# Patient Record
Sex: MALE | Race: WHITE | NOT HISPANIC OR LATINO | Employment: OTHER | URBAN - METROPOLITAN AREA
[De-identification: names, ages, dates, MRNs, and addresses within clinical notes are randomized per-mention and may not be internally consistent; named-entity substitution may affect disease eponyms.]

---

## 2022-05-11 ENCOUNTER — E-VISIT (OUTPATIENT)
Dept: URGENT CARE | Facility: CLINIC | Age: 32
End: 2022-05-11
Payer: COMMERCIAL

## 2022-05-11 DIAGNOSIS — L21.9 SEBORRHEIC DERMATITIS: Primary | ICD-10-CM

## 2022-05-11 PROCEDURE — 99421 OL DIG E/M SVC 5-10 MIN: CPT | Performed by: PHYSICIAN ASSISTANT

## 2022-05-11 RX ORDER — KETOCONAZOLE 20 MG/ML
SHAMPOO TOPICAL
Qty: 120 ML | Refills: 3 | Status: SHIPPED | OUTPATIENT
Start: 2022-05-11

## 2022-05-12 NOTE — PATIENT INSTRUCTIONS
Dear Tab Campbell    I have sent in a topical antifungal medication for this problem. Steroids like you have used in the past are some help but usually symptoms continue to come back and there are long term side effects to using those medications regularily.    Please try this medicated medication Ketoconazole 2% which can be helpful at improving symptoms but also keeping them under control.          Seborrheic Dermatitis  What Is Seborrheic Dermatitis?    This is a very common skin disease that causes a rash on the skin. When the rash appears it often looks red, swollen, and greasy. It may or may not have a white or yellowish crusty scale to it.     Sometimes the skin may be itchy.    Seborrheic dermatitis can look like psoriasis and eczema.    This skin condition is not caused by poor hygiene.     Adolescents and Adults: Scalp  Depending on your hair type, you can consider shampooing more often which can help.   For the scalp, many people find relief from using a dandruff shampoo  Use a dandruff shampoo twice a week. If using one dandruff shampoo does not bring relief, try alternating dandruff shampoos. Each dandruff shampoo should contain a different active ingredient. The active ingredients in dandruff shampoos are;    Zinc pyrithione    Salicylic acid and sulfur    Coal tar    Selenium sulfide    Ketoconazole  ** If you have blond, gray or white hair, do not use dandruff shampoos that contain coal tar, as this can discolor your hair.  When using dandruff shampoos; follow the instructions on the shampoo bottle. Some require that you lather and leave it on for about five minutes before rinsing. Others should not be left on the scalp.  If you use a shampoo that contains coal tar, you must protect your scalp from the sun. You can do this by wearing a hat when outdoors and not using indoor tanning devices such as tanning beds or sun lamps.    Thanks for choosing us as your health care partner,    [facility]  AYANA Escalona

## 2022-06-10 NOTE — PROGRESS NOTES
CC: Tab Campbell is an 31 year old male who presents for preventative health visit.     ASSESSMENT/PLAN:   (Z00.00) Routine general medical examination at a health care facility  (primary encounter diagnosis)  Comment: Patient appears healthy, exercises frequently, and eats healthy food.   Plan: CBC with platelets, Comprehensive metabolic         panel (BMP + Alb, Alk Phos, ALT, AST, Total.         Bili, TP)    (L40.9) Psoriasis of scalp  Comment: Patient does not remember the name of his previous medication but did take a picture of the medication  Plan: Patient advised to message provider the name of his previous medication via [employer]. Will provide perscription for new medication once we have that information    (R20.9) Altered sensation, foot  Comment: possibly sciatic nerve involvement  Plan: Patient shown how to perform nerve flossing. Was advised to perform 20 reps/leg each day. Will monitor.    (R25.3) Muscle twitch  Comment: likely idiopathic or from dehydration  Plan: Will check BMP for electrolyte distrubances. Patient advised to hydrate and perform calf stretches    (R21) Rash and nonspecific skin eruption, chest  Comment: Painful rash on chest upon exercise, disappears with continued exercise - not a concern at this time  Plan: monitor    (Z11.4) Screening for HIV (human immunodeficiency virus) and hepatitis C screening test   Comment: no known exposures  Plan: HIV Antigen Antibody Combo  Hepatitis C Screen Reflex to HCV RNA Quant and Genotype        SUBJECTIVE:     HPI:  Tab is a 31 year old man who presents for a preventative health visit. Tab recently moved back to [place] from [place]. He has no known family history of cardiovascular disease, diabetes, or hypertension. His mother does have a history of a rare disease known as delayed pressure urticaria. Tab says that when he bends over (ex tying shoes) and sits upward, he has blotching skin on his forehead. He was able to  demonstrate this phenomenon. However, the red blotches quickly disappear and no not bother him. He also sometimes gets a painful rash on his chest when he exercises, which disappears as he continues to exercise.     He has a history of psoriasis on his scalp, which is worse during the winter months. He was switched from his previous medication (unknown) to a ketoconazole shampoo. He does not like his new shampoo and would like to switch back to his old medication.     For the past few years, Tab has had an occasional tickling sensation to his plantar feet bilaterally. The sensations bothers him to the point that he must get up from the couch to walk around while watching movie. The sensation seems to occur when sitting and does not bother him while he sleeps.     For the past few months, Tab has noticed twitching in his calves bilaterally. It does not bother him.       Patient has been advised of split billing requirements and indicates understanding: Yes  Healthy Habits:     Getting at least 3 servings of Calcium per day:  Yes    Bi-annual eye exam:  NO    Dental care twice a year:  NO    Sleep apnea or symptoms of sleep apnea:  None    Diet:  Regular (no restrictions)    Frequency of exercise:  2-3 days/week    Duration of exercise:  30-45 minutes    Taking medications regularly:  Yes    Medication side effects:  None    PHQ-2 Total Score: 0    Additional concerns today:  Yes    Today's PHQ-2 Score:   PHQ-2 ( 1999 [employer]) 6/13/2022   Q1: Little interest or pleasure in doing things 0   Q2: Feeling down, depressed or hopeless 0   PHQ-2 Score 0   Q1: Little interest or pleasure in doing things Not at all   Q2: Feeling down, depressed or hopeless Not at all   PHQ-2 Score 0       Abuse: Current or Past(Physical, Sexual or Emotional)- No  Do you feel safe in your environment? Yes    Have you ever done Advance Care Planning?no    Social History     Tobacco Use     Smoking status: Not on file     Smokeless tobacco:  Not on file   Substance Use Topics     Alcohol use: Not on file     If you drink alcohol do you typically have >3 drinks per day or >7 drinks per week? No    Alcohol Use 6/13/2022   Prescreen: >3 drinks/day or >7 drinks/week? No   No flowsheet data found.    Last PSA: No results found for: PSA    Reviewed orders with patient. Reviewed health maintenance and updated orders accordingly - Yes  Lab work is in process    Reviewed and updated as needed this visit by clinical staff                    Reviewed and updated as needed this visit by Provider                       Review of Systems   Constitutional: Negative for chills and fever.   HENT: Negative for congestion, ear pain, hearing loss and sore throat.    Eyes: Negative for pain and visual disturbance.   Respiratory: Negative for cough and shortness of breath.    Cardiovascular: Negative for chest pain, palpitations and peripheral edema.   Gastrointestinal: Negative for abdominal pain, constipation, diarrhea, heartburn, hematochezia and nausea.   Genitourinary: Negative for dysuria, frequency, genital sores, hematuria and urgency.   Musculoskeletal: Negative for arthralgias, joint swelling and myalgias.   Skin: Negative for rash.   Neurological: Negative for dizziness, weakness, headaches and paresthesias.   Psychiatric/Behavioral: Negative for mood changes. The patient is not nervous/anxious.      INTEGUMENTARY/SKIN: NEGATIVE for worrisome rashes, moles or lesions    OBJECTIVE:   There were no vitals taken for this visit.    Physical Exam  GENERAL: healthy, alert and no distress  EYES: Eyes grossly normal to inspection, PERRL and conjunctivae and sclerae normal  HENT: ear canals and TM's normal, nose and mouth without ulcers or lesions  NECK: no adenopathy, no asymmetry, masses, or scars and thyroid normal to palpation  RESP: lungs clear to auscultation - no rales, rhonchi or wheezes  CV: regular rate and rhythm, normal S1 S2, no S3 or S4, no murmur, click or  rub, no peripheral edema and peripheral pulses strong  ABDOMEN: soft, nontender, no hepatosplenomegaly, no masses and bowel sounds normal  MS: no gross musculoskeletal defects noted, no edema  SKIN: no suspicious lesions or rashes  NEURO: Normal strength and tone, mentation intact and speech normal  PSYCH: mentation appears normal, affect normal/bright    Diagnostic Test Results:  Labs reviewed in Epic  Patient has been advised of split billing requirements and indicates understanding: No    COUNSELING:   Reviewed preventive health counseling, as reflected in patient instructions       Regular exercise       Healthy diet/nutrition    Counseling Resources:  ATP IV Guidelines  Pooled Cohorts Equation Calculator  FRAX Risk Assessment  ICSI Preventive Guidelines  Dietary Guidelines for Americans, 2010  USDA's MyPlate  ASA Prophylaxis  Lung CA Screening    Scribe Disclosure:   I, Reagan Douglas, am serving as a scribe; to document services personally performed by Joe Kurtz MD- -based on data collection and the provider's statements to me.     Provider Disclosure:  I agree with above History, Review of Systems, Physical exam and Plan.  I have reviewed the content of the documentation and have edited it as needed. I have personally performed the services documented here and the documentation accurately represents those services and the decisions I have made.      Electronically signed by:    Joe Kurtz MD  [facility]

## 2022-06-13 ENCOUNTER — OFFICE VISIT (OUTPATIENT)
Dept: FAMILY MEDICINE | Facility: CLINIC | Age: 32
End: 2022-06-13
Payer: COMMERCIAL

## 2022-06-13 VITALS
SYSTOLIC BLOOD PRESSURE: 114 MMHG | DIASTOLIC BLOOD PRESSURE: 75 MMHG | HEART RATE: 60 BPM | OXYGEN SATURATION: 99 % | BODY MASS INDEX: 26.39 KG/M2 | TEMPERATURE: 96 F | HEIGHT: 74 IN | RESPIRATION RATE: 14 BRPM | WEIGHT: 205.6 LBS

## 2022-06-13 DIAGNOSIS — L40.9 PSORIASIS OF SCALP: ICD-10-CM

## 2022-06-13 DIAGNOSIS — Z00.00 ROUTINE GENERAL MEDICAL EXAMINATION AT A HEALTH CARE FACILITY: Primary | ICD-10-CM

## 2022-06-13 DIAGNOSIS — Z11.59 NEED FOR HEPATITIS C SCREENING TEST: ICD-10-CM

## 2022-06-13 DIAGNOSIS — R20.9 ALTERED SENSATION, FOOT: ICD-10-CM

## 2022-06-13 DIAGNOSIS — R25.3 MUSCLE TWITCH: ICD-10-CM

## 2022-06-13 DIAGNOSIS — R21 RASH AND NONSPECIFIC SKIN ERUPTION: ICD-10-CM

## 2022-06-13 DIAGNOSIS — Z11.4 SCREENING FOR HIV (HUMAN IMMUNODEFICIENCY VIRUS): ICD-10-CM

## 2022-06-13 LAB
ALBUMIN SERPL-MCNC: 4.1 G/DL (ref 3.4–5)
ALP SERPL-CCNC: 84 U/L (ref 40–150)
ALT SERPL W P-5'-P-CCNC: 22 U/L (ref 0–70)
ANION GAP SERPL CALCULATED.3IONS-SCNC: 3 MMOL/L (ref 3–14)
AST SERPL W P-5'-P-CCNC: 18 U/L (ref 0–45)
BILIRUB SERPL-MCNC: 0.4 MG/DL (ref 0.2–1.3)
BUN SERPL-MCNC: 16 MG/DL (ref 7–30)
CALCIUM SERPL-MCNC: 9.1 MG/DL (ref 8.5–10.1)
CHLORIDE BLD-SCNC: 111 MMOL/L (ref 94–109)
CO2 SERPL-SCNC: 27 MMOL/L (ref 20–32)
CREAT SERPL-MCNC: 0.79 MG/DL (ref 0.66–1.25)
ERYTHROCYTE [DISTWIDTH] IN BLOOD BY AUTOMATED COUNT: 13 % (ref 10–15)
GFR SERPL CREATININE-BSD FRML MDRD: >90 ML/MIN/1.73M2
GLUCOSE BLD-MCNC: 94 MG/DL (ref 70–99)
HCT VFR BLD AUTO: 42 % (ref 40–53)
HCV AB SERPL QL IA: NONREACTIVE
HGB BLD-MCNC: 13.7 G/DL (ref 13.3–17.7)
HIV 1+2 AB+HIV1 P24 AG SERPL QL IA: NONREACTIVE
MCH RBC QN AUTO: 29.9 PG (ref 26.5–33)
MCHC RBC AUTO-ENTMCNC: 32.6 G/DL (ref 31.5–36.5)
MCV RBC AUTO: 92 FL (ref 78–100)
PLATELET # BLD AUTO: 212 10E3/UL (ref 150–450)
POTASSIUM BLD-SCNC: 4.5 MMOL/L (ref 3.4–5.3)
PROT SERPL-MCNC: 7.2 G/DL (ref 6.8–8.8)
RBC # BLD AUTO: 4.58 10E6/UL (ref 4.4–5.9)
SODIUM SERPL-SCNC: 141 MMOL/L (ref 133–144)
WBC # BLD AUTO: 5.2 10E3/UL (ref 4–11)

## 2022-06-13 PROCEDURE — 36415 COLL VENOUS BLD VENIPUNCTURE: CPT | Performed by: FAMILY MEDICINE

## 2022-06-13 PROCEDURE — 80053 COMPREHEN METABOLIC PANEL: CPT | Performed by: FAMILY MEDICINE

## 2022-06-13 PROCEDURE — 87389 HIV-1 AG W/HIV-1&-2 AB AG IA: CPT | Performed by: FAMILY MEDICINE

## 2022-06-13 PROCEDURE — 85027 COMPLETE CBC AUTOMATED: CPT | Performed by: FAMILY MEDICINE

## 2022-06-13 PROCEDURE — 99385 PREV VISIT NEW AGE 18-39: CPT | Performed by: FAMILY MEDICINE

## 2022-06-13 PROCEDURE — 86803 HEPATITIS C AB TEST: CPT | Performed by: FAMILY MEDICINE

## 2022-06-13 ASSESSMENT — ENCOUNTER SYMPTOMS
MYALGIAS: 0
NERVOUS/ANXIOUS: 0
DIZZINESS: 0
NAUSEA: 0
CONSTIPATION: 0
HEARTBURN: 0
FREQUENCY: 0
PALPITATIONS: 0
ARTHRALGIAS: 0
EYE PAIN: 0
ABDOMINAL PAIN: 0
COUGH: 0
HEMATOCHEZIA: 0
PARESTHESIAS: 0
WEAKNESS: 0
JOINT SWELLING: 0
DIARRHEA: 0
DYSURIA: 0
CHILLS: 0
HEMATURIA: 0
SORE THROAT: 0
SHORTNESS OF BREATH: 0
FEVER: 0
HEADACHES: 0

## 2022-06-13 NOTE — NURSING NOTE
"Chief Complaint   Patient presents with     Physical     /75   Pulse 60   Temp (!) 96  F (35.6  C) (Tympanic)   Resp 14   Ht 1.88 m (6' 2\")   Wt 93.3 kg (205 lb 9.6 oz)   SpO2 99%   BMI 26.40 kg/m   Estimated body mass index is 26.4 kg/m  as calculated from the following:    Height as of this encounter: 1.88 m (6' 2\").    Weight as of this encounter: 93.3 kg (205 lb 9.6 oz).  bp completed using cuff size: regular      Health Maintenance addressed:  NONE    n/a    Vanna Sarmiento, RN, MA     "

## 2022-12-26 ENCOUNTER — HEALTH MAINTENANCE LETTER (OUTPATIENT)
Age: 32
End: 2022-12-26

## 2022-12-29 ENCOUNTER — E-VISIT (OUTPATIENT)
Dept: URGENT CARE | Facility: CLINIC | Age: 32
End: 2022-12-29
Payer: COMMERCIAL

## 2022-12-29 DIAGNOSIS — R21 RASH AND NONSPECIFIC SKIN ERUPTION: Primary | ICD-10-CM

## 2022-12-29 PROCEDURE — 99207 PR NON-BILLABLE SERV PER CHARTING: CPT | Performed by: NURSE PRACTITIONER

## 2022-12-30 ENCOUNTER — VIRTUAL VISIT (OUTPATIENT)
Dept: FAMILY MEDICINE | Facility: CLINIC | Age: 32
End: 2022-12-30
Payer: COMMERCIAL

## 2022-12-30 DIAGNOSIS — L40.9 PSORIASIS: Primary | ICD-10-CM

## 2022-12-30 PROCEDURE — 99213 OFFICE O/P EST LOW 20 MIN: CPT | Mod: 95 | Performed by: FAMILY MEDICINE

## 2022-12-30 RX ORDER — BETAMETHASONE DIPROPIONATE 0.05 %
OINTMENT (GRAM) TOPICAL AT BEDTIME
Qty: 50 G | Refills: 0 | Status: SHIPPED | OUTPATIENT
Start: 2022-12-30 | End: 2023-01-04

## 2022-12-30 RX ORDER — PREDNISONE 20 MG/1
20 TABLET ORAL DAILY
Qty: 5 TABLET | Refills: 0 | Status: SHIPPED | OUTPATIENT
Start: 2022-12-30 | End: 2023-01-04

## 2022-12-30 RX ORDER — BETAMETHASONE DIPROPIONATE 0.5 MG/G
LOTION TOPICAL AT BEDTIME
Qty: 60 ML | Refills: 0 | Status: SHIPPED | OUTPATIENT
Start: 2022-12-30 | End: 2023-01-06

## 2022-12-30 NOTE — PROGRESS NOTES
"Tab is a 32 year old who is being evaluated via a billable video visit.      How would you like to obtain your AVS? MyChart  If the video visit is dropped, the invitation should be resent by: Text to cell phone: [phone]  Will anyone else be joining your video visit? No        Assessment & Plan     Psoriasis  - was diagnosed in the past     Noticed flare up in the last week .  On the scalp and the forehead .      - betamethasone dipropionate (DIPROSONE) 0.05 % external ointment; Apply topically At Bedtime for 5 days  - betamethasone dipropionate (DIPROSONE) 0.05 % external lotion; Apply topically At Bedtime for 7 days ON AFFECTED AREA  - predniSONE (DELTASONE) 20 MG tablet; Take 1 tablet (20 mg) by mouth daily for 5 days    Discussed avoiding use close to the eyes   Discussed dermatology visit if it fails to improve .          Return in about 4 weeks (around 1/27/2023) for Follow up, if symptoms fail to imptove.    Elena Mason MD  [facility]    Subjective    presenting for the following health issues:  Derm Problem (Follow-up Psoriasis)      History of Present Illness       Reason for visit:  Psoriasis    He eats 2-3 servings of fruits and vegetables daily.He consumes 0 sweetened beverage(s) daily.He exercises with enough effort to increase his heart rate 20 to 29 minutes per day.  He exercises with enough effort to increase his heart rate 4 days per week.   He is taking medications regularly.           Review of Systems   Skin rash .      Objective    Vitals - Patient Reported  Weight (Patient Reported): 93 kg (205 lb)  Height (Patient Reported): 188 cm (6' 2\")  BMI (Based on Pt Reported Ht/Wt): 26.32      Vitals:  No vitals were obtained today due to virtual visit.    Physical Exam   GENERAL: Healthy, alert and no distress  EYES: Eyes grossly normal to inspection.  No discharge or erythema, or obvious scleral/conjunctival abnormalities.  RESP: No audible wheeze, cough, or visible " cyanosis.  No visible retractions or increased work of breathing.    SKIN: erythematous rash on the scalp and face   Consistent with psoriasis .        Video-Visit Details    Type of service:  Video Visit     Originating Location (pt. Location): Home     START TIME :353  End time :4:03     Distant Location (provider location):  On-site  Platform used for Video Visit: [employer]

## 2022-12-30 NOTE — PATIENT INSTRUCTIONS
Dear Tab Campbell,    We are sorry you are not feeling well. Based on the responses you provided, it is recommended that you be seen in-person in urgent care so we can better evaluate your symptoms. Please click here to find the nearest urgent care location to you.   You will not be charged for this Visit. Thank you for trusting us with your care.    Tamara Wlison, CNP     That does not look like eczema. You need to come in for evaluation.

## 2023-05-15 ENCOUNTER — PATIENT OUTREACH (OUTPATIENT)
Dept: CARE COORDINATION | Facility: CLINIC | Age: 33
End: 2023-05-15
Payer: COMMERCIAL

## 2023-05-30 ENCOUNTER — PATIENT OUTREACH (OUTPATIENT)
Dept: CARE COORDINATION | Facility: CLINIC | Age: 33
End: 2023-05-30
Payer: COMMERCIAL

## 2023-09-17 ENCOUNTER — HEALTH MAINTENANCE LETTER (OUTPATIENT)
Age: 33
End: 2023-09-17

## 2023-09-23 ENCOUNTER — MYC MEDICAL ADVICE (OUTPATIENT)
Dept: FAMILY MEDICINE | Facility: CLINIC | Age: 33
End: 2023-09-23
Payer: COMMERCIAL

## 2023-09-25 NOTE — CONFIDENTIAL NOTE
Called pt - no answer.  Left VM and sent MyChart that if still interested in Paxlovid to call back.    Robyn Voss RN  [facility]

## 2024-05-06 ENCOUNTER — OFFICE VISIT (OUTPATIENT)
Dept: URGENT CARE | Facility: URGENT CARE | Age: 34
End: 2024-05-06

## 2024-05-06 ENCOUNTER — E-VISIT (OUTPATIENT)
Dept: URGENT CARE | Facility: CLINIC | Age: 34
End: 2024-05-06

## 2024-05-06 VITALS
DIASTOLIC BLOOD PRESSURE: 75 MMHG | TEMPERATURE: 98 F | BODY MASS INDEX: 26.19 KG/M2 | HEART RATE: 95 BPM | WEIGHT: 204 LBS | OXYGEN SATURATION: 98 % | SYSTOLIC BLOOD PRESSURE: 135 MMHG

## 2024-05-06 DIAGNOSIS — R50.9 FEVER, UNSPECIFIED FEVER CAUSE: Primary | ICD-10-CM

## 2024-05-06 DIAGNOSIS — S80.861A TICK BITE OF RIGHT LOWER LEG, INITIAL ENCOUNTER: ICD-10-CM

## 2024-05-06 DIAGNOSIS — R05.1 ACUTE COUGH: Primary | ICD-10-CM
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
## 2024-05-06 LAB
BASOPHILS # BLD AUTO: ABNORMAL 10*3/UL
BASOPHILS # BLD MANUAL: 0 10E3/UL (ref 0–0.2)
BASOPHILS NFR BLD AUTO: ABNORMAL %
BASOPHILS NFR BLD MANUAL: 1 %
DEPRECATED S PYO AG THROAT QL EIA: NEGATIVE
EOSINOPHIL # BLD AUTO: ABNORMAL 10*3/UL
EOSINOPHIL # BLD MANUAL: 0 10E3/UL (ref 0–0.7)
EOSINOPHIL NFR BLD AUTO: ABNORMAL %
EOSINOPHIL NFR BLD MANUAL: 1 %
ERYTHROCYTE [DISTWIDTH] IN BLOOD BY AUTOMATED COUNT: 12.2 % (ref 10–15)
FLUAV AG SPEC QL IA: NEGATIVE
FLUBV AG SPEC QL IA: NEGATIVE
GROUP A STREP BY PCR: NOT DETECTED
HCT VFR BLD AUTO: 43.3 % (ref 40–53)
HGB BLD-MCNC: 14.4 G/DL (ref 13.3–17.7)
IMM GRANULOCYTES # BLD: ABNORMAL 10*3/UL
IMM GRANULOCYTES NFR BLD: ABNORMAL %
LYMPHOCYTES # BLD AUTO: ABNORMAL 10*3/UL
LYMPHOCYTES # BLD MANUAL: 0.6 10E3/UL (ref 0.8–5.3)
LYMPHOCYTES NFR BLD AUTO: ABNORMAL %
LYMPHOCYTES NFR BLD MANUAL: 28 %
MCH RBC QN AUTO: 29.2 PG (ref 26.5–33)
MCHC RBC AUTO-ENTMCNC: 33.3 G/DL (ref 31.5–36.5)
MCV RBC AUTO: 88 FL (ref 78–100)
MONOCYTES # BLD AUTO: ABNORMAL 10*3/UL
MONOCYTES # BLD MANUAL: 0.3 10E3/UL (ref 0–1.3)
MONOCYTES NFR BLD AUTO: ABNORMAL %
MONOCYTES NFR BLD MANUAL: 12 %
NEUTROPHILS # BLD AUTO: ABNORMAL 10*3/UL
NEUTROPHILS # BLD MANUAL: 1.3 10E3/UL (ref 1.6–8.3)
NEUTROPHILS NFR BLD AUTO: ABNORMAL %
NEUTROPHILS NFR BLD MANUAL: 58 %
NRBC # BLD AUTO: 0 10E3/UL
NRBC BLD AUTO-RTO: 0 /100
PLAT MORPH BLD: ABNORMAL
PLATELET # BLD AUTO: 120 10E3/UL (ref 150–450)
RBC # BLD AUTO: 4.93 10E6/UL (ref 4.4–5.9)
RBC MORPH BLD: ABNORMAL
WBC # BLD AUTO: 2.2 10E3/UL (ref 4–11)

## 2024-05-06 PROCEDURE — 36415 COLL VENOUS BLD VENIPUNCTURE: CPT | Performed by: FAMILY MEDICINE

## 2024-05-06 PROCEDURE — 99207 PR NON-BILLABLE SERV PER CHARTING: CPT | Performed by: FAMILY MEDICINE

## 2024-05-06 PROCEDURE — 87804 INFLUENZA ASSAY W/OPTIC: CPT | Performed by: FAMILY MEDICINE

## 2024-05-06 PROCEDURE — 85027 COMPLETE CBC AUTOMATED: CPT | Performed by: FAMILY MEDICINE

## 2024-05-06 PROCEDURE — 87651 STREP A DNA AMP PROBE: CPT | Performed by: FAMILY MEDICINE

## 2024-05-06 PROCEDURE — 86618 LYME DISEASE ANTIBODY: CPT | Performed by: FAMILY MEDICINE

## 2024-05-06 PROCEDURE — 85007 BL SMEAR W/DIFF WBC COUNT: CPT | Performed by: FAMILY MEDICINE

## 2024-05-06 PROCEDURE — 99214 OFFICE O/P EST MOD 30 MIN: CPT | Performed by: FAMILY MEDICINE

## 2024-05-06 RX ORDER — DOXYCYCLINE HYCLATE 100 MG
100 TABLET ORAL 2 TIMES DAILY
Qty: 28 TABLET | Refills: 0 | Status: SHIPPED | OUTPATIENT
Start: 2024-05-06 | End: 2024-05-20

## 2024-05-06 NOTE — PATIENT INSTRUCTIONS
Dear Tab Campbell,    We are sorry you are not feeling well. Based on the responses you provided and in light of tick exposure, it is recommended that you be seen in-person in urgent care so we can better evaluate your symptoms. Please click here to find the nearest urgent care location to you.   You will not be charged for this Visit. Thank you for trusting us with your care.    ALBARO COWAN CNP

## 2024-05-06 NOTE — PROGRESS NOTES
CC:   Chief Complaint   Patient presents with    Urgent Care     Fever, headaches x4 days.  Had tick bite 2 weeks ago and found 2 additional ticks      Fever, headaches x4 days, light cough.      Had tick bite 2 weeks ago and found 2 additional ticks  Tick was attached for a couple days    Ill contacts: no others at home with similar illness  No recent travel out of the continental [place].    Last antibiotic reported as: none      Current Outpatient Medications   Medication Sig Dispense Refill    doxycycline hyclate (VIBRA-TABS) 100 MG tablet Take 1 tablet (100 mg) by mouth 2 times daily for 14 days 28 tablet 0    ketoconazole (NIZORAL) 2 % external shampoo Lather, Leave on for 3-5 minutes prior to rinsing.  Apply 2 x per week for 4 weeks, then once weekly for maintenance/prevention. (Patient not taking: Reported on 5/6/2024) 120 mL 3     No current facility-administered medications for this visit.     I have reviewed the patient's medical history in detail; there [facility]    ROS: As per HPI.      EXAM  /75   Pulse 95   Temp 98  F (36.7  C) (Temporal)   Wt 92.5 kg (204 lb)   SpO2 98%   BMI 26.19 kg/m    Gen: Healthy appearing male in no apparent distress  Eyes: no icterus  Skin: No rashes    Results for orders placed or performed in visit on 05/06/24   Streptococcus A Rapid Screen w/Reflex to PCR - Clinic Collect     Status: Normal    Specimen: Throat; Swab   Result Value Ref Range    Group A Strep antigen Negative Negative   Influenza A & B Antigen - Clinic Collect     Status: Normal    Specimen: Nose; Swab   Result Value Ref Range    Influenza A antigen Negative Negative    Influenza B antigen Negative Negative    Narrative    Test results must be correlated with clinical data. If necessary, results should be confirmed by a molecular assay or viral culture.   CBC with platelets and differential     Status: None (In process)    Narrative    The  following orders were created for panel order CBC with platelets and differential.  Procedure                               Abnormality         Status                     ---------                               -----------         ------                     CBC with platelets and d...[[id number]]                      In process                   Please view results for these tests on the individual orders.       Preliminary CBC showing leukocytopenia and typical lymphocytes  ASSESSMENT/PLAN:    ICD-10-CM    1. Fever, unspecified fever cause  R50.9 Streptococcus A Rapid Screen w/Reflex to PCR - Clinic Collect     Influenza A & B Antigen - Clinic Collect     Group A Streptococcus PCR Throat Swab     CBC with platelets and differential     Lyme Disease Total Abs Bld with Reflex to Confirm CLIA     doxycycline hyclate (VIBRA-TABS) 100 MG tablet     CBC with platelets and differential     Lyme Disease Total Abs Bld with Reflex to Confirm CLIA      2. Tick bite of right lower leg, initial encounter  S80.861A CBC with platelets and differential    W57.XXXA Lyme Disease Total Abs Bld with Reflex to Confirm CLIA     doxycycline hyclate (VIBRA-TABS) 100 MG tablet     CBC with platelets and differential     Lyme Disease Total Abs Bld with Reflex to Confirm CLIA        Differential diagnosis includes various kinds of viral infections but common upper respiratory infections have been ruled out    Does include tick disease because of the history of tick bite about 2 weeks before the symptoms started    Lyme disease also Ehrlichia or anaplasmosis    We will treat presumptively       Govind Celestin MD MPH

## 2024-05-07 LAB — B BURGDOR IGG+IGM SER QL: 0.07

## 2024-11-10 ENCOUNTER — HEALTH MAINTENANCE LETTER (OUTPATIENT)
Age: 34
End: 2024-11-10

## 2024-12-02 ENCOUNTER — OFFICE VISIT (OUTPATIENT)
Dept: FAMILY MEDICINE | Facility: CLINIC | Age: 34
End: 2024-12-02
Payer: COMMERCIAL

## 2024-12-02 VITALS
HEART RATE: 70 BPM | TEMPERATURE: 97.5 F | SYSTOLIC BLOOD PRESSURE: 119 MMHG | RESPIRATION RATE: 16 BRPM | HEIGHT: 74 IN | OXYGEN SATURATION: 98 % | BODY MASS INDEX: 26.36 KG/M2 | WEIGHT: 205.4 LBS | DIASTOLIC BLOOD PRESSURE: 74 MMHG

## 2024-12-02 DIAGNOSIS — Z13.6 ENCOUNTER FOR LIPID SCREENING FOR CARDIOVASCULAR DISEASE: ICD-10-CM

## 2024-12-02 DIAGNOSIS — Z13.220 ENCOUNTER FOR LIPID SCREENING FOR CARDIOVASCULAR DISEASE: ICD-10-CM

## 2024-12-02 DIAGNOSIS — R73.9 HYPERGLYCEMIA: ICD-10-CM

## 2024-12-02 DIAGNOSIS — R41.840 INATTENTION: ICD-10-CM

## 2024-12-02 DIAGNOSIS — Z00.00 WELL ADULT EXAM: Primary | ICD-10-CM

## 2024-12-02 LAB
ALBUMIN SERPL BCG-MCNC: 4.6 G/DL (ref 3.5–5.2)
ALP SERPL-CCNC: 91 U/L (ref 40–150)
ALT SERPL W P-5'-P-CCNC: 28 U/L (ref 0–70)
ANION GAP SERPL CALCULATED.3IONS-SCNC: 9 MMOL/L (ref 7–15)
AST SERPL W P-5'-P-CCNC: 27 U/L (ref 0–45)
BILIRUB SERPL-MCNC: 0.5 MG/DL
BUN SERPL-MCNC: 19.4 MG/DL (ref 6–20)
CALCIUM SERPL-MCNC: 9.7 MG/DL (ref 8.8–10.4)
CHLORIDE SERPL-SCNC: 104 MMOL/L (ref 98–107)
CHOLEST SERPL-MCNC: 215 MG/DL
CREAT SERPL-MCNC: 0.82 MG/DL (ref 0.67–1.17)
EGFRCR SERPLBLD CKD-EPI 2021: >90 ML/MIN/1.73M2
EST. AVERAGE GLUCOSE BLD GHB EST-MCNC: 105 MG/DL
FASTING STATUS PATIENT QL REPORTED: YES
FASTING STATUS PATIENT QL REPORTED: YES
GLUCOSE SERPL-MCNC: 104 MG/DL (ref 70–99)
HBA1C MFR BLD: 5.3 % (ref 0–5.6)
HCO3 SERPL-SCNC: 27 MMOL/L (ref 22–29)
HDLC SERPL-MCNC: 54 MG/DL
LDLC SERPL CALC-MCNC: 147 MG/DL
NONHDLC SERPL-MCNC: 161 MG/DL
POTASSIUM SERPL-SCNC: 5.3 MMOL/L (ref 3.4–5.3)
PROT SERPL-MCNC: 7.2 G/DL (ref 6.4–8.3)
SODIUM SERPL-SCNC: 140 MMOL/L (ref 135–145)
TRIGL SERPL-MCNC: 71 MG/DL

## 2024-12-02 PROCEDURE — 80053 COMPREHEN METABOLIC PANEL: CPT | Performed by: FAMILY MEDICINE

## 2024-12-02 PROCEDURE — 36415 COLL VENOUS BLD VENIPUNCTURE: CPT | Performed by: FAMILY MEDICINE

## 2024-12-02 PROCEDURE — 99213 OFFICE O/P EST LOW 20 MIN: CPT | Mod: 25 | Performed by: FAMILY MEDICINE

## 2024-12-02 PROCEDURE — 99395 PREV VISIT EST AGE 18-39: CPT | Mod: 25 | Performed by: FAMILY MEDICINE

## 2024-12-02 PROCEDURE — 80061 LIPID PANEL: CPT | Performed by: FAMILY MEDICINE

## 2024-12-02 PROCEDURE — 83036 HEMOGLOBIN GLYCOSYLATED A1C: CPT | Performed by: FAMILY MEDICINE

## 2024-12-02 SDOH — HEALTH STABILITY: PHYSICAL HEALTH: ON AVERAGE, HOW MANY DAYS PER WEEK DO YOU ENGAGE IN MODERATE TO STRENUOUS EXERCISE (LIKE A BRISK WALK)?: 4 DAYS

## 2024-12-02 SDOH — HEALTH STABILITY: PHYSICAL HEALTH: ON AVERAGE, HOW MANY MINUTES DO YOU ENGAGE IN EXERCISE AT THIS LEVEL?: 60 MIN

## 2024-12-02 ASSESSMENT — SOCIAL DETERMINANTS OF HEALTH (SDOH): HOW OFTEN DO YOU GET TOGETHER WITH FRIENDS OR RELATIVES?: ONCE A WEEK

## 2024-12-02 NOTE — PATIENT INSTRUCTIONS
Healthy Lifestyle   Nutrition and healthy eating: The Mediterranean Diet  Ready to switch to a more heart-healthy diet? Here's how to get started with the Mediterranean diet.  By [facility] Staff   If you're looking for a heart-healthy eating plan, the Mediterranean diet might be right for you.  The Mediterranean diet blends the basics of healthy eating with the traditional flavors and cooking methods of the [place].  Interest in the Mediterranean diet began in the 1960s with the observation that coronary heart disease caused fewer deaths in Mediterranean countries, such as [place] and [place], than in the [place] and northern [place]. Subsequent studies found that the Mediterranean diet is associated with reduced risk factors for cardiovascular disease.  The Mediterranean diet is one of the healthy eating plans recommended by the Dietary Guidelines for Americans to promote health and prevent chronic disease.  It is also recognized by the World Health Organization as a healthy and sustainable dietary pattern and as an intangible cultural asset by the United National Educational, Scientific and Cultural Organization.  The Mediterranean diet is a way of eating based on the traditional cuisine of countries bordering the [place] Sea. While there is no single definition of the Mediterranean diet, it is typically high in vegetables, fruits, whole grains, beans, nut and seeds, and olive oil.  The main components of Mediterranean diet include:  Daily consumption of vegetables, fruits, whole grains and healthy fats   Weekly intake of fish, poultry, beans and eggs   Moderate portions of dairy products   Limited intake of red meat  Other important elements of the Mediterranean diet are sharing meals with family and friends, enjoying a glass of red wine and being physically active.  The foundation of the Mediterranean diet is vegetables, fruits, herbs, nuts, beans and whole grains. Meals are built around these  "plant-based foods. Moderate amounts of dairy, poultry and eggs are also central to the Mediterranean Diet, as is seafood. In contrast, red meat is eaten only occasionally.  Healthy fats are a mainstay of the Mediterranean diet. They're eaten instead of less healthy fats, such as saturated and trans fats, which contribute to heart disease.  Olive oil is the primary source of added fat in the Mediterranean diet. Olive oil provides monounsaturated fat, which has been found to lower total cholesterol and low-density lipoprotein (LDL or \"bad\") cholesterol levels. Nuts and seeds also contain monounsaturated fat.  Fish are also important in the Mediterranean diet. Fatty fish -- such as mackerel, herring, sardines, albacore tuna, salmon and lake trout -- are rich in omega-3 fatty acids, a type of polyunsaturated fat that may reduce inflammation in the body. Omega-3 fatty acids also help decrease triglycerides, reduce blood clotting, and decrease the risk of stroke and heart failure.  The Mediterranean diet typically allows red wine in moderation. Although alcohol has been associated with a reduced risk of heart disease in some studies, it's by no means risk free. The Dietary Guidelines for Americans caution against beginning to drink or drinking more often on the basis of potential health benefits.  Interested in trying the Mediterranean diet? These tips will help you get started:  Eat more fruits and vegetables. Aim for 7 to 10 servings a day of fruit and vegetables.   Opt for whole grains. Switch to whole-grain bread, cereal and pasta. Quitaque with other whole grains, such as bulgur and farro.   Use healthy fats. Try olive oil as a replacement for butter when cooking. Instead of putting butter or margarine on bread, try dipping it in flavored olive oil.   Eat more seafood. Eat fish twice a week. Fresh or water-packed tuna, salmon, trout, mackerel and herring are healthy choices. Grilled fish tastes good and requires " little cleanup. Avoid deep-fried fish.   Reduce red meat. Substitute fish, poultry or beans for meat. If you eat meat, make sure it's lean and keep portions small.   Enjoy some dairy. Eat low-fat Greek or plain yogurt and small amounts of a variety of cheeses.   Spice it up. Herbs and spices boost flavor and lessen the need for salt.  The Mediterranean diet is a delicious and healthy way to eat. Many people who switch to this style of eating say they'll never eat any other way.

## 2024-12-02 NOTE — PROGRESS NOTES
"Preventive Care Visit  [facility], [facility]  Dec 2, 2024      Assessment & Plan       ICD-10-CM    1. Well adult exam  Z00.00 Comprehensive metabolic panel     Comprehensive metabolic panel      2. Encounter for lipid screening for cardiovascular disease  Z13.220 Lipid panel reflex to direct LDL Fasting    Z13.6 Lipid panel reflex to direct LDL Fasting      3. Hyperglycemia  R73.9 Hemoglobin A1c     Hemoglobin A1c      4. Inattention  R41.840 Adult Mental Health  Referral            Patient has been advised of split billing requirements and indicates understanding: Yes        BMI  Estimated body mass index is 26.22 kg/m  as calculated from the following:    Height as of this encounter: 1.885 m (6' 2.21\").    Weight as of this encounter: 93.2 kg (205 lb 6.4 oz).   Weight management plan: Discussed healthy diet and exercise guidelines    Counseling  Appropriate preventive services were addressed with this patient via screening, questionnaire, or discussion as appropriate for fall prevention, nutrition, physical activity, Tobacco-use cessation, social engagement, weight loss and cognition.  Checklist reviewing preventive services available has been given to the patient.  Reviewed patient's diet, addressing concerns and/or questions.   The patient was instructed to see the dentist every 6 months.       Patient Instructions     Healthy Lifestyle   Nutrition and healthy eating: The Mediterranean Diet  Ready to switch to a more heart-healthy diet? Here's how to get started with the Mediterranean diet.  By [facility] Staff   If you're looking for a heart-healthy eating plan, the Mediterranean diet might be right for you.  The Mediterranean diet blends the basics of healthy eating with the traditional flavors and cooking methods of the Mediterranean.  Interest in the Mediterranean diet began in the 1960s with the observation that coronary heart disease caused " "fewer deaths in Mediterranean countries, such as [place] and [place], than in the [place] and [place]. Subsequent studies found that the Mediterranean diet is associated with reduced risk factors for cardiovascular disease.  The Mediterranean diet is one of the healthy eating plans recommended by the Dietary Guidelines for Americans to promote health and prevent chronic disease.  It is also recognized by the World Health Organization as a healthy and sustainable dietary pattern and as an intangible cultural asset by the United National Educational, Scientific and Cultural Organization.  The Mediterranean diet is a way of eating based on the traditional cuisine of countries bordering the [place] Sea. While there is no single definition of the Mediterranean diet, it is typically high in vegetables, fruits, whole grains, beans, nut and seeds, and olive oil.  The main components of Mediterranean diet include:  Daily consumption of vegetables, fruits, whole grains and healthy fats   Weekly intake of fish, poultry, beans and eggs   Moderate portions of dairy products   Limited intake of red meat  Other important elements of the Mediterranean diet are sharing meals with family and friends, enjoying a glass of red wine and being physically active.  The foundation of the Mediterranean diet is vegetables, fruits, herbs, nuts, beans and whole grains. Meals are built around these plant-based foods. Moderate amounts of dairy, poultry and eggs are also central to the Mediterranean Diet, as is seafood. In contrast, red meat is eaten only occasionally.  Healthy fats are a mainstay of the Mediterranean diet. They're eaten instead of less healthy fats, such as saturated and trans fats, which contribute to heart disease.  Olive oil is the primary source of added fat in the Mediterranean diet. Olive oil provides monounsaturated fat, which has been found to lower total cholesterol and low-density lipoprotein (LDL or \"bad\") " cholesterol levels. Nuts and seeds also contain monounsaturated fat.  Fish are also important in the Mediterranean diet. Fatty fish -- such as mackerel, herring, sardines, albacore tuna, salmon and lake trout -- are rich in omega-3 fatty acids, a type of polyunsaturated fat that may reduce inflammation in the body. Omega-3 fatty acids also help decrease triglycerides, reduce blood clotting, and decrease the risk of stroke and heart failure.  The Mediterranean diet typically allows red wine in moderation. Although alcohol has been associated with a reduced risk of heart disease in some studies, it's by no means risk free. The Dietary Guidelines for Americans caution against beginning to drink or drinking more often on the basis of potential health benefits.  Interested in trying the Mediterranean diet? These tips will help you get started:  Eat more fruits and vegetables. Aim for 7 to 10 servings a day of fruit and vegetables.   Opt for whole grains. Switch to whole-grain bread, cereal and pasta. [place] with other whole grains, such as bulgur and farro.   Use healthy fats. Try olive oil as a replacement for butter when cooking. Instead of putting butter or margarine on bread, try dipping it in flavored olive oil.   Eat more seafood. Eat fish twice a week. Fresh or water-packed tuna, salmon, trout, mackerel and herring are healthy choices. Grilled fish tastes good and requires little cleanup. Avoid deep-fried fish.   Reduce red meat. Substitute fish, poultry or beans for meat. If you eat meat, make sure it's lean and keep portions small.   Enjoy some dairy. Eat low-fat Greek or plain yogurt and small amounts of a variety of cheeses.   Spice it up. Herbs and spices boost flavor and lessen the need for salt.  The Mediterranean diet is a delicious and healthy way to eat. Many people who switch to this style of eating say they'll never eat any other way.      Kristin Barth is a 33 year old, presenting for the  following:  Physical        12/2/2024     9:37 AM   Additional Questions   Roomed by Dixie   Accompanied by none         12/2/2024     9:37 AM   Patient Reported Additional Medications   Patient reports taking the following new medications none              HPI    Patient has had longstanding issue with attention  Would like to be tested for ADHD            Health Care Directive  Patient does not have a Health Care Directive: Discussed advance care planning with patient; however, patient declined at this time.      12/2/2024   General Health   How would you rate your overall physical health? Excellent   Feel stress (tense, anxious, or unable to sleep) Only a little      (!) STRESS CONCERN      12/2/2024   Nutrition   Three or more servings of calcium each day? Yes   Diet: Regular (no restrictions)    Carbohydrate counting   How many servings of fruit and vegetables per day? (!) 2-3   How many sweetened beverages each day? 0-1       Multiple values from one day are sorted in reverse-chronological order         12/2/2024   Exercise   Days per week of moderate/strenous exercise 4 days   Average minutes spent exercising at this level 60 min            12/2/2024   Social Factors   Frequency of gathering with friends or relatives Once a week   Worry food won't last until get money to buy more No   Food not last or not have enough money for food? No   Do you have housing? (Housing is defined as stable permanent housing and does not include staying ouside in a car, in a tent, in an abandoned building, in an overnight shelter, or couch-surfing.) Yes   Are you worried about losing your housing? No   Lack of transportation? No   Unable to get utilities (heat,electricity)? No            12/2/2024   Dental   Dentist two times every year? (!) NO            12/2/2024   TB Screening   Were you born outside of the [place]? No            Today's PHQ-2 Score:       12/2/2024     9:12 AM   PHQ-2 ( 1999 [employer])   Q1: Little interest or  "pleasure in doing things 0    Q2: Feeling down, depressed or hopeless 1    PHQ-2 Score 1    Q1: Little interest or pleasure in doing things Not at all   Q2: Feeling down, depressed or hopeless Several days   PHQ-2 Score 1       Patient-reported           12/2/2024   Substance Use   Alcohol more than 3/day or more than 7/wk No   Do you use any other substances recreationally? No        Social History     Tobacco Use    Smoking status: Never    Smokeless tobacco: Never   Vaping Use    Vaping status: Never Used           12/2/2024   STI Screening   New sexual partner(s) since last STI/HIV test? No            12/2/2024   Contraception/Family Planning   Questions about contraception or family planning No           Reviewed and updated as needed this visit by Provider                    BP Readings from Last 3 Encounters:   12/02/24 119/74   05/06/24 135/75   06/13/22 114/75    Wt Readings from Last 3 Encounters:   12/02/24 93.2 kg (205 lb 6.4 oz)   05/06/24 92.5 kg (204 lb)   06/13/22 93.3 kg (205 lb 9.6 oz)                      Review of Systems  Constitutional, HEENT, cardiovascular, pulmonary, gi and gu systems are negative, except as otherwise noted.     Objective    Exam  /74   Pulse 70   Temp 97.5  F (36.4  C) (Temporal)   Resp 16   Ht 1.885 m (6' 2.21\")   Wt 93.2 kg (205 lb 6.4 oz)   SpO2 98%   BMI 26.22 kg/m     Estimated body mass index is 26.22 kg/m  as calculated from the following:    Height as of this encounter: 1.885 m (6' 2.21\").    Weight as of this encounter: 93.2 kg (205 lb 6.4 oz).    Physical Exam  Vitals reviewed.   Constitutional:       Appearance: Normal appearance. He is not ill-appearing.   HENT:      Head: Normocephalic.      Right Ear: Tympanic membrane and ear canal normal.      Left Ear: Tympanic membrane and ear canal normal.      Nose: Nose normal.   Eyes:      Extraocular Movements: Extraocular movements intact.   Cardiovascular:      Rate and Rhythm: Normal rate and regular " rhythm.      Heart sounds: Normal heart sounds. No murmur heard.  Pulmonary:      Effort: Pulmonary effort is normal. No respiratory distress.      Breath sounds: Normal breath sounds. No wheezing or rales.   Abdominal:      Palpations: Abdomen is soft.   Musculoskeletal:         General: Normal range of motion.      Cervical back: Normal range of motion and neck supple.   Skin:     General: Skin is warm and dry.      Findings: No lesion.   Neurological:      Mental Status: He is alert and oriented to person, place, and time.   Psychiatric:         Mood and Affect: Mood normal.         Behavior: Behavior normal.         Thought Content: Thought content normal.         Judgment: Judgment normal.               Signed Electronically by: Adams Rubio MD

## 2025-02-19 ENCOUNTER — VIRTUAL VISIT (OUTPATIENT)
Facility: CLINIC | Age: 35
End: 2025-02-19
Attending: FAMILY MEDICINE
Payer: COMMERCIAL

## 2025-02-19 DIAGNOSIS — Z13.39 ADHD (ATTENTION DEFICIT HYPERACTIVITY DISORDER) EVALUATION: ICD-10-CM

## 2025-02-19 DIAGNOSIS — F43.21 ADJUSTMENT DISORDER WITH DEPRESSED MOOD: ICD-10-CM

## 2025-02-19 DIAGNOSIS — F41.1 GENERALIZED ANXIETY DISORDER: Primary | ICD-10-CM

## 2025-02-19 PROCEDURE — 90834 PSYTX W PT 45 MINUTES: CPT | Mod: 95

## 2025-02-19 ASSESSMENT — ANXIETY QUESTIONNAIRES
6. BECOMING EASILY ANNOYED OR IRRITABLE: NEARLY EVERY DAY
5. BEING SO RESTLESS THAT IT IS HARD TO SIT STILL: MORE THAN HALF THE DAYS
1. FEELING NERVOUS, ANXIOUS, OR ON EDGE: SEVERAL DAYS
IF YOU CHECKED OFF ANY PROBLEMS ON THIS QUESTIONNAIRE, HOW DIFFICULT HAVE THESE PROBLEMS MADE IT FOR YOU TO DO YOUR WORK, TAKE CARE OF THINGS AT HOME, OR GET ALONG WITH OTHER PEOPLE: SOMEWHAT DIFFICULT
7. FEELING AFRAID AS IF SOMETHING AWFUL MIGHT HAPPEN: SEVERAL DAYS
3. WORRYING TOO MUCH ABOUT DIFFERENT THINGS: MORE THAN HALF THE DAYS
2. NOT BEING ABLE TO STOP OR CONTROL WORRYING: SEVERAL DAYS
GAD7 TOTAL SCORE: 11
GAD7 TOTAL SCORE: 11

## 2025-02-19 ASSESSMENT — COLUMBIA-SUICIDE SEVERITY RATING SCALE - C-SSRS
1. IN THE PAST MONTH, HAVE YOU WISHED YOU WERE DEAD OR WISHED YOU COULD GO TO SLEEP AND NOT WAKE UP?: NO
REASONS FOR IDEATION LIFETIME: COMPLETELY TO END OR STOP THE PAIN (YOU COULDN'T GO ON LIVING WITH THE PAIN OR HOW YOU WERE FEELING)
TOTAL  NUMBER OF ABORTED OR SELF INTERRUPTED ATTEMPTS LIFETIME: NO
TOTAL  NUMBER OF INTERRUPTED ATTEMPTS LIFETIME: NO
6. HAVE YOU EVER DONE ANYTHING, STARTED TO DO ANYTHING, OR PREPARED TO DO ANYTHING TO END YOUR LIFE?: NO
2. HAVE YOU ACTUALLY HAD ANY THOUGHTS OF KILLING YOURSELF?: NO
ATTEMPT LIFETIME: NO
1. HAVE YOU WISHED YOU WERE DEAD OR WISHED YOU COULD GO TO SLEEP AND NOT WAKE UP?: YES

## 2025-02-19 ASSESSMENT — PATIENT HEALTH QUESTIONNAIRE - PHQ9
SUM OF ALL RESPONSES TO PHQ QUESTIONS 1-9: 12
10. IF YOU CHECKED OFF ANY PROBLEMS, HOW DIFFICULT HAVE THESE PROBLEMS MADE IT FOR YOU TO DO YOUR WORK, TAKE CARE OF THINGS AT HOME, OR GET ALONG WITH OTHER PEOPLE: SOMEWHAT DIFFICULT
SUM OF ALL RESPONSES TO PHQ QUESTIONS 1-9: 12
5. POOR APPETITE OR OVEREATING: SEVERAL DAYS

## 2025-02-19 NOTE — PROGRESS NOTES
[facility]   Mental Health & Addiction Services     Progress Note - Initial Visit    Client Name:  Tab Campbell Date: 2025         Service Type: Individual     Visit Start Time: 9:00AM  Visit End Time: 9:38AM    Visit #: 1    Attendees: Client attended alone    Service Modality:  Video Visit:      Provider verified identity through the following two step process.  Patient provided:  Patient  and Patient address    Telemedicine Visit: The patient's condition can be safely assessed and treated via synchronous audio and visual telemedicine encounter.      Reason for Telemedicine Visit: Patient convenience (e.g. access to timely appointments / distance to available provider)    Originating Site (Patient Location): Patient's home    Distant Site (Provider Location): Provider Remote Setting- Home Office    Consent:  The patient/guardian has verbally consented to: the potential risks and benefits of telemedicine (video visit) versus in person care; bill my insurance or make self-payment for services provided; and responsibility for payment of non-covered services.     Patient would like the video invitation sent by:  Send to e-mail at: [email or web address]    Mode of Communication:  Video Conference via [employer]    Distant Location (Provider):  Off-site    As the provider I attest to compliance with applicable laws and regulations related to telemedicine.       DATA:  Extended Session (53+ minutes): No  Interactive Complexity: No   Crisis: No     Presenting Concerns/Current Stressors:   Patient presented to session to initiate the ADHD evaluation process.           2025     8:39 AM   PHQ   PHQ-9 Total Score 12    Q9: Thoughts of better off dead/self-harm past 2 weeks Several days   F/U: Thoughts of suicide or self-harm No   F/U: Safety concerns Yes       Patient-reported   **Patient was asked about his response to Q9 and stated that he marked his chosen response by  "accident. Patient verbally denied any thoughts of harming himself or others in the past several months and denied any personal safety concerns.          2/19/2025     8:57 AM   ANUJA-7 SCORE   Total Score 11       ASSESSMENT:  Mental Status Assessment:  Appearance:   Appropriate   Eye Contact:   Good   Psychomotor Behavior: Normal   Attitude:   Cooperative   Orientation:   All  Speech   Rate / Production: Normal/ Responsive   Volume:  Normal   Mood:    Appropriate  Affect:    Appropriate   Thought Content:  Clear   Thought Form:  Coherent   Insight:    Good       Safety Issues and Plan for Safety and Risk Management:   [place] Suicide Severity Rating Scale (Lifetime/Recent)      2/19/2025     9:04 AM   [place] Suicide Severity Rating (Lifetime/Recent)   1. Wish to be Dead (Lifetime) Y   Wish to be Dead Description (Lifetime) 2024, when dad passed away; history of other occurrences of passive SI prior to this, \"when things get a little tough\"   1. Wish to be Dead (Past 1 Month) N   2. Non-Specific Active Suicidal Thoughts (Lifetime) N   Controllability (Lifetime) 1   Reasons for Ideation (Lifetime) 5   Actual Attempt (Lifetime) N   Has subject engaged in non-suicidal self-injurious behavior? (Lifetime) Y   Has subject engaged in non-suicidal self-injurious behavior? (Past 3 Months) N   Interrupted Attempts (Lifetime) N   Aborted or Self-Interrupted Attempt (Lifetime) N   Preparatory Acts or Behavior (Lifetime) N   Calculated C-SSRS Risk Score (Lifetime/Recent) No Risk Indicated     Patient denies current fears or concerns for personal safety.  Patient denies current or recent suicidal ideation or behaviors.  Patient denies current or recent homicidal ideation or behaviors.  Patient denies current or recent self injurious behavior or ideation.  Patient denies other safety concerns.  Recommended that patient call 911 or go to the local ED should there be a change in any of these risk factors. Patient denied safety " plan.   Patient reports there are no firearms in the house.    Diagnostic Criteria:  A. Excessive anxiety and worry, occurring more days than not for at least 6 months about a number of events or activities.   B. The individual finds it difficult to control the worry.  C. The anxiety and worry are associated with 3 or more of 6 symptoms.  D. The anxiety, worry, or physical symptoms cause clinically significant distress or impairment in social, occupational, or other important areas of functioning.  E. The disturbance is not attributable to the physiological effects of a substance (e.g., a drug of abuse, a medication) or another medical condition (e.g., hyperthyroidism).  F. The disturbance is not better explained by another mental disorder (e.g., anxiety or worry about having panic attacks in panic disorder, negative evaluation in social anxiety disorder [social phobia], contamination or other obsessions in obsessive-compulsive disorder, separation from attachment figures in separation anxiety disorder, reminders of traumatic events in posttraumatic stress disorder, gaining weight in anorexia nervosa, physical complaints in somatic symptom disorder, perceived appearance flaws in body dysmorphic disorder, having a serious illness in illness anxiety disorder, or the content of delusional beliefs in schizophrenia or delusional disorder).    A. The development of emotional or behavioral symptoms in response to an identifiable stressor(s) occurring within 3 months of the onset of the stressor(s).   B. These symptoms or behaviors are clinically significant, as evidenced by one or both of the followin. Marked distress that is out of proportion to the severity of intensity of the stressor, taking into account the external context and the cultural factors that might influence symptom severity and presentation.               2. Significant impairment in social, occupational, or other important areas of functioning.  C.  The stress-related disturbance does not meet the criteria for another mental disorder and is not merely an exacerbation of a preexisting mental disorder.   D. The symptoms do not represent normal bereavement and are not better explained by prolonged grief disorder.   E. Once the stressor or its consequences have terminated, the symptoms do not persist for more than an additional 6 months.      DSM5 Diagnoses: (Sustained by DSM5 Criteria Listed Above)  Diagnoses:   1. Generalized anxiety disorder    2. Adjustment disorder with depressed mood    3. ADHD (attention deficit hyperactivity disorder) evaluation      Psychosocial & Contextual Factors: None    PROMIS-10 Scores  Global Mental Health Score: (Patient-Rptd) (P) 13  Global Physical Health Score: (Patient-Rptd) (P) 16   PROMIS TOTAL - SUBSCORES: (Patient-Rptd) (P) 29      Intervention:              Established safety. Reviewed symptoms and history of presenting concern. Patient endorsed symptoms consistent with depression , anxiety , and ADHD. Patient denied symptoms associated with donnell, panic, OCD, trauma, Autism, perceptual difficulties, and disordered eating. Unable to complete diagnostic intake, will be completed in next session.  CBT: socratic questioning, positive reinforcement  EFT: empathetic attunement, emotion checking, emotion naming  MI: open ended questions, affirmations, reflections        Attendance Agreement:  Client has not signed the attendance agreement. Discussed expectations at beginning of this first session and patient agreed.       PLAN:  Provider will continue Diagnostic Assessment in next session. Patient will complete Roberto questionnaires  and CNS Vital Signs prior to next session (2/26/2025).    Patient meets the following risk assessment and triage: Patient denied any current/recent history of suicidal ideation and/or behaviors.  No safety plan indicated at this time. Patient denied safety plan.    Medical necessity criteria is  warranted in order to:  Measure a psychological disorder and its severity and functional impairment to determine psychiatric diagnosis when a mental illness is suspected, or to achieve a differential diagnosis from a range of medical/psychological disorders that present with similar constellations of symptoms (e.g., determination and measurement of anxiety severity and impact in the presence of ongoing asthma or heart disease), Perform symptom measurement to objectively measure treatment effectiveness and/or determine the need to refer for pharmacological treatment or other medical evaluation (e.g., based on severity and chronicity of symptoms), and Evaluate primary symptoms of impaired attention and concentration that can occur in many neurological and psychiatric conditions.     Medical necessity for psychological assessment is warranted as a result of the following: (1) A specific clinical question is posed that relates to the condition/symptoms being addressed (2) The question cannot be adequately addressed by clinical interview and/or behavioral observation (3) Results of psychological testing are reasonably expected to provide an answer to the query (4) It is reasonably expected that the testing will provide information leading to a clearer diagnosis and/or guide treatment planning with an expectation of improved clinical outcome.    I acknowledge that, based upon current clinical information, the patient and I have reviewed and discussed issues pertaining to the purpose of therapy/testing, potential therapeutic goals, procedures, risks and benefits, and estimated duration of therapy/testing. Issues pertaining to fees/insurance and confidentiality were also addressed with the patient, who indicated understanding and elected to continue with appointments. I will not be providing any experimental procedures and, if we agree that a change in clinical procedure would be more beneficial, I will obtain specific  consent for that procedure or refer you to another provider who has expertise in that area.       Merna Park MA

## 2025-02-26 ENCOUNTER — VIRTUAL VISIT (OUTPATIENT)
Facility: CLINIC | Age: 35
End: 2025-02-26
Payer: COMMERCIAL

## 2025-02-26 DIAGNOSIS — F41.1 GENERALIZED ANXIETY DISORDER: Primary | ICD-10-CM

## 2025-02-26 DIAGNOSIS — F43.21 ADJUSTMENT DISORDER WITH DEPRESSED MOOD: ICD-10-CM

## 2025-02-26 DIAGNOSIS — Z13.39 ADHD (ATTENTION DEFICIT HYPERACTIVITY DISORDER) EVALUATION: ICD-10-CM

## 2025-02-26 PROCEDURE — 90791 PSYCH DIAGNOSTIC EVALUATION: CPT | Mod: 95

## 2025-02-26 NOTE — PROGRESS NOTES
[facility]  Provider Name:  Merna Park     Credentials:  MA    PATIENT'S NAME: Tab Campbell  PREFERRED NAME: Tab  PRONOUNS: He/him/his  MRN: [id number]  : 1990  ADDRESS: [facility] [address]  [facility] [address]  ACCT. NUMBER:  [id number]  DATE OF SERVICE: 25  START TIME: 11:00AM  END TIME: 11:40AM  PREFERRED PHONE: [phone]  SERVICE MODALITY:  Video Visit:      Provider verified identity through the following two step process.  Patient provided:  Patient  and Patient address    Telemedicine Visit: The patient's condition can be safely assessed and treated via synchronous audio and visual telemedicine encounter.      Reason for Telemedicine Visit: Patient convenience (e.g. access to timely appointments / distance to available provider)    Originating Site (Patient Location): Patient's home    Distant Site (Provider Location): Provider Remote Setting- Home Office    Consent:  The patient/guardian has verbally consented to: the potential risks and benefits of telemedicine (video visit) versus in person care; bill my insurance or make self-payment for services provided; and responsibility for payment of non-covered services.     Patient would like the video invitation sent by:  Send to e-mail at: [email or web address]    Mode of Communication:  Video Conference via Amwell    Distant Location (Provider):  Off-site    As the provider I attest to compliance with applicable laws and regulations related to telemedicine.    UNIVERSAL ADULT Mental Health DIAGNOSTIC ASSESSMENT    Identifying Information:  Patient is a 34 year old,  cisgender male. The pronoun use throughout this assessment reflects the patient's chosen pronoun. Patient was referred for an assessment by his PCP. Patient attended the session alone.     Chief Complaint:   Patient reported seeking services at this time for diagnostic assessment and recommendations for treatment. Patient's presenting  concerns include: difficulties with inattention and hyperactivity. Specifically, the patient reported experiencing the following symptoms: difficulty sustaining attention, problems listening when spoken to directly, not following through with tasks, difficulty organizing, being easily distracted, being forgetful, is fidgety, and talks excessively/interrupts others.     Patient reported that he has not been assessed for ADHD in the past. Symptoms reportedly began in childhood. The patient reported difficulty sustaining attention in school as a child and struggling to submit assignments once completed. Client reported that other professional(s) are involved in providing services, as he was referred by his PCP.    Social/Family History:  Patient reported he grew up in [place], [place]. Patient was the only child but reported that he has since gained maternal and paternal half-siblings. There are no known complications during pregnancy or delivery.  The patient grew up with a single, teenage mother and describes experiencing financial hardship and homelessness in early childhood . Patient reported no difficulty with childhood peer relationships. Reported that childhood was difficult due to financial strain. Described his current relationships with family of origin as supportive with frequent communication.      The patient denied a history of learning disorders, special education programming, and receiving tutoring services. Patient reported a history of childhood sports-related head injuries but denies any diagnosed concussions or loss of consciousness. As a child, he reported struggling throughout school but having supportive relationships with teachers. Recalled academic strengths in math and gym as well as weaknesses in English and social studies. The patient's highest education level is some college.    The patient describes his cultural background as White.  Cultural influences and impact on patient's life  structure, values, norms, and healthcare:  patient reported a childhood history of minimal access to medical care, which he reported complicates his relationships with current medical professionals .  Contextual influences on patient's health include: None. Patient identified his preferred language to be English. Patient reported he does not need the assistance of an  or other support involved in therapy.     Patient is in a relationship. Patient identified their sexual orientation as heterosexual. Patient reported having zero child(nii). Patient identified partner as part of his support system. Patient identified the quality of these relationships as good.      Patient is staying in own home/apartment. Patient lives with his partner. Housing is stable.     Patient is currently employed full time and reports they are able to function appropriately at work.. Patient reports finances are obtained through employment.     Patient has not served in the .     Patient reported that he has not been involved with the legal system. Patient denies being on probation / parole / under the jurisdiction of the court.    Patient has received a 's license. Patient denies any past or current concerns about his driving.    Patient's Strengths and Limitations:  Patient identified the following strengths or resources that will help them succeed in treatment: exercise routine, friends / good social support, and work ethic. Things that may interfere with the patient's success in treatment include: none identified.     Personal and Family Medical History:  Patient reported no family history of mental health issues.  Patient has not been previously diagnosed with a mental health diagnosis.   Patient has not received mental health services in the past.   Patient is not currently receiving any mental health services.  Hospitalizations: None.   Previous/Current commitments: None.     Patient has had a physical exam to  rule out medical causes for current symptoms. Date of last physical exam was 2/2/2024. The patient's PCP is Adams Santiago MD. Patient reported no current medical concerns. Patient denies any issues with pain.. There are not significant appetite/nutritional concerns/weight changes.    Current Outpatient Medications   Medication Sig Dispense Refill    ketoconazole (NIZORAL) 2 % external shampoo Lather, Leave on for 3-5 minutes prior to rinsing.  Apply 2 x per week for 4 weeks, then once weekly for maintenance/prevention. (Patient not taking: Reported on 12/2/2024) 120 mL 3     No current facility-administered medications for this visit.       N/A - Client does not have prescribed psychiatric medications.    Patient Allergies: No Known Allergies    Medical History:   Patient Active Problem List   Diagnosis    Altered sensation, foot    Psoriasis of scalp    Rash and nonspecific skin eruption    Muscle twitch     Family history includes: mother in good health, father in good health.     Current Mental Status Exam:   Appearance:  Appropriate    Eye Contact:  Good   Psychomotor:  Normal       Gait / station:  no problem  Attitude / Demeanor: Cooperative   Speech      Rate / Production: Normal/ Responsive      Volume:  Normal  volume      Language:  intact  Mood:   Euthymic  Affect:   Appropriate    Thought Content: Clear   Thought Process: Coherent       Associations: No loosening of associations  Insight:   Good   Judgment:  Intact   Orientation:  All  Attention/concentration: Good    Rating Scales:  PHQ9:        2/19/2025     8:39 AM   PHQ-9 SCORE   PHQ-9 Total Score MyChart 12 (Moderate depression)   PHQ-9 Total Score 12        Patient-reported       GAD7:        2/19/2025     8:57 AM   ANUJA-7 SCORE   Total Score 11       Substance Use:  Patient did not report a family history of substance use concerns; see medical history section for details. Patient has not received chemical dependency treatment in the past.  "Patient has not ever been to detox. Patient is not currently receiving any chemical dependency treatment. Patient reported  no  problems as a result of his substance use.    Alcohol: 3 times per week, 2 drinks per sitting; patient denied a history of risky or dangerous behaviors when using alcohol  Nicotine: None  Cannabis: None  Caffeine: None  Street Drugs: None  Prescription Drugs: None    CAGE: None of the patient's responses to the CAGE screening were positive / Negative CAGE score     Substance Use: No symptoms    Based on the negative CAGE score and clinical interview there are not indications of drug or alcohol abuse.    Significant Losses/Trauma/Abuse/Neglect Issues:   There are indications or report of significant loss, trauma, abuse or neglect issues related to: are no indications and client denies any losses, trauma, abuse, or neglect concerns.  Concerns for possible neglect are not present.    Safety Assessment:   Vernon Suicide Severity Rating Scale (Lifetime/Recent)Vernon Suicide Severity Rating Scale (Lifetime/Recent)      2/19/2025     9:04 AM   Vernon Suicide Severity Rating (Lifetime/Recent)   1. Wish to be Dead (Lifetime) Y   Wish to be Dead Description (Lifetime) 2024, when dad passed away; history of other occurrences of passive SI prior to this, \"when things get a little tough\"   1. Wish to be Dead (Past 1 Month) N   2. Non-Specific Active Suicidal Thoughts (Lifetime) N   Controllability (Lifetime) 1   Reasons for Ideation (Lifetime) 5   Actual Attempt (Lifetime) N   Has subject engaged in non-suicidal self-injurious behavior? (Lifetime) Y   Has subject engaged in non-suicidal self-injurious behavior? (Past 3 Months) N   Interrupted Attempts (Lifetime) N   Aborted or Self-Interrupted Attempt (Lifetime) N   Preparatory Acts or Behavior (Lifetime) N   Calculated C-SSRS Risk Score (Lifetime/Recent) No Risk Indicated     Patient denies current homicidal ideation and behaviors.  Patient " denies current self-injurious ideation and behaviors.    Patient denied risk behaviors associated with substance use.  Patient denies any high risk behaviors associated with mental health symptoms.  Patient reports the following current concerns for their personal safety: None.  Patient reports there are not firearms in the house.     History of Safety Concerns:  Patient denied a history of homicidal ideation.     Patient denied a history of personal safety concerns.    Patient denied a history of assaultive behaviors.    Patient denied a history of sexual assault behaviors.     Patient denied a history of risk behaviors associated with substance use.  Patient denies any history of high risk behaviors associated with mental health symptoms.  Patient reports the following protective factors: forward or future oriented thinking; dedication to family or friends; safe and stable environment; regular sleep; effectively controls impulses; regular physical activity; sense of belonging; purpose; secure attachment; help seeking behaviors when distressed; daily obligations; structured day; effective problem solving skills; commitment to well being; sense of meaning; positive social skills; healthy fear of risky behaviors or pain; financial stability; strong sense of self worth or esteem; sense of personal control or determination; access to a variety of clinical interventions and pets    Risk Plan:  See Recommendations for Safety and Risk Management Plan below.    Review of Patient-Reported Symptoms:  Depression: Feeling sad, down, or depressed  [place]lali:  No Symptoms  Psychosis: No Symptoms  Anxiety: Excessive worry, Nervousness, Poor concentration, and Irritability  Panic:  No symptoms  Post Traumatic Stress Disorder:  Experienced traumatic event (witnessed his father pass away due to heart attack in late 2023) and Nightmares   Eating Disorder: No Symptoms  ADD / ADHD:  Inattentive, Difficulties listening, Poor organizational  skills, Distractibility, Interrupts, Intrudes, and Restlessness/fidgety  Conduct Disorder: No symptoms  Autism Spectrum Disorder: No observed symptoms. An autism spectrum disorder diagnosis requires specialized assessment.  Obsessive Compulsive Disorder: No Symptoms  Patient reports the following compulsive behaviors and treatment history: None.      Diagnostic Criteria:   A. Excessive anxiety and worry, occurring more days than not for at least 6 months about a number of events or activities.   B. The individual finds it difficult to control the worry.  C. The anxiety and worry are associated with 3 or more of 6 symptoms.  D. The anxiety, worry, or physical symptoms cause clinically significant distress or impairment in social, occupational, or other important areas of functioning.  E. The disturbance is not attributable to the physiological effects of a substance (e.g., a drug of abuse, a medication) or another medical condition (e.g., hyperthyroidism).  F. The disturbance is not better explained by another mental disorder (e.g., anxiety or worry about having panic attacks in panic disorder, negative evaluation in social anxiety disorder [social phobia], contamination or other obsessions in obsessive-compulsive disorder, separation from attachment figures in separation anxiety disorder, reminders of traumatic events in posttraumatic stress disorder, gaining weight in anorexia nervosa, physical complaints in somatic symptom disorder, perceived appearance flaws in body dysmorphic disorder, having a serious illness in illness anxiety disorder, or the content of delusional beliefs in schizophrenia or delusional disorder).     A. The development of emotional or behavioral symptoms in response to an identifiable stressor(s) occurring within 3 months of the onset of the stressor(s).   B. These symptoms or behaviors are clinically significant, as evidenced by one or both of the followin. Marked distress that is out  of proportion to the severity of intensity of the stressor, taking into account the external context and the cultural factors that might influence symptom severity and presentation.               2. Significant impairment in social, occupational, or other important areas of functioning.  C. The stress-related disturbance does not meet the criteria for another mental disorder and is not merely an exacerbation of a preexisting mental disorder.   D. The symptoms do not represent normal bereavement and are not better explained by prolonged grief disorder.   E. Once the stressor or its consequences have terminated, the symptoms do not persist for more than an additional 6 months.       Functional Status:  Patient reports the following functional impairments: organization, relationship(s), and work / vocational responsibilities.       PROMIS-10:   Global Mental Health Score: (Patient-Rptd) (P) 13  Global Physical Health Score: (Patient-Rptd) (P) 15   PROMIS TOTAL - SUBSCORES: (Patient-Rptd) (P) 28   Nonprogrammatic care: Patient is requesting basic services to address current mental health concerns.    Clinical Summary:  1. Reason for assessment: assessing reported deficits in executive functioning (rule in/out ADHD).  2. Psychosocial, cultural and contextual factors: history of childhood minimal access to medical care  3. Principal DSM-5 diagnoses (Sustained by DSM5 Criteria Listed Above) and other diagnoses relevant to this service:   1. Generalized anxiety disorder    2. Adjustment disorder with depressed mood    3. ADHD (attention deficit hyperactivity disorder) evaluation    4. Prognosis: Expect Improvement.  5. Likely consequences of symptoms if not treated: continued difficulties with inattention and hyperactivity.  6. Client strengths include: employed, goal-focused, motivated, and support of family, friends and providers .     Recommendations:   Per medical necessity criteria for psychological testing, patient  will complete MMPI-3 before feedback session is scheduled. The patient was made aware that the link expires after 30 days and if the test is not completed within that timeframe, it will be his responsibility to reinitiate contact to resume the testing process.  My contact information was provided.  Patient was in agreement to this plan.    1. Plan for Safety and Risk Management:  Safety and Risk: Recommended that patient call 911 or go to the local ED should there be a change in any of these risk factors.         Report to child / adult protection services was NA.     2. Patient's identified mental health concerns with a cultural influence will be addressed in final recommendations.     3. Initial Treatment will focus on: ADHD testing. See above.      4. Resources/Service Plan:    services are not indicated.   Modifications to assist communication are not indicated.   Additional disability accommodations are not indicated.      5. Collaboration:   Collaboration / coordination of treatment will be initiated with the following  support professionals: primary care physician.      6.  Referrals:   The following referral(s) will be initiated:  None . Next Scheduled Appointment: to be scheduled following completion of MMPI-3.    A Release of Information has been obtained for the following:  None .   Emergency Contact was not obtained.    Clinical Substantiation/medical necessity for the above recommendations:     Medical necessity criteria is warranted in order to: Measure a psychological disorder and its severity and functional impairment to determine psychiatric diagnosis when a mental illness is suspected, or to achieve a differential diagnosis from a range of medical/psychological disorders that present with similar constellations of symptoms (e.g., determination and measurement of anxiety severity and impact in the presence of ongoing asthma or heart disease), Perform symptom measurement to objectively  measure treatment effectiveness and/or determine the need to refer for pharmacological treatment or other medical evaluation (e.g., based on severity and chronicity of symptoms), and Evaluate primary symptoms of impaired attention and concentration that can occur in many neurological and psychiatric conditions.     Medical necessity for psychological assessment is warranted as a result of the following: (1) A specific clinical question is posed that relates to the condition/symptoms being addressed (2) The question cannot be adequately addressed by clinical interview and/or behavioral observation (3) Results of psychological testing are reasonably expected to provide an answer to the query (4) It is reasonably expected that the testing will provide information leading to a clearer diagnosis and/or guide treatment planning with an expectation of improved clinical outcome.    7. MAICOL: Final recommendations will be made at the client's final feedback session.     8. Records:   These were reviewed at time of assessment.   Information in this assessment was obtained from the medical record and  provided by patient who is a fair historian. Patient will have open access to their mental health medical record.    9. Interactive Complexity: No     Parts of this documentation may have been completed using dictation software. Potential errors may result and are unintentional.       Merna Park MA  February 26, 2025

## 2025-04-15 ENCOUNTER — VIRTUAL VISIT (OUTPATIENT)
Facility: CLINIC | Age: 35
End: 2025-04-15
Payer: COMMERCIAL

## 2025-04-15 DIAGNOSIS — F43.21 ADJUSTMENT DISORDER WITH DEPRESSED MOOD: ICD-10-CM

## 2025-04-15 DIAGNOSIS — F41.1 GENERALIZED ANXIETY DISORDER: Primary | ICD-10-CM

## 2025-04-15 PROCEDURE — 96130 PSYCL TST EVAL PHYS/QHP 1ST: CPT | Mod: 95

## 2025-04-15 PROCEDURE — 96131 PSYCL TST EVAL PHYS/QHP EA: CPT | Mod: 95

## 2025-04-15 NOTE — PROGRESS NOTES
[facility]   Mental Health & Addiction Services     Progress Note - ADHD Feedback Session     Patient Name: Tab Campbell  Date: April 15, 2025       Service Type:  Individual       Session Start Time: 10:00AM  Session End Time:  10:38AM     Session Length: 38 minutes    Session #: 3    Attendees: Patient attended alone    Service Modality: Video Visit:      Provider verified identity through the following two step process.  Patient provided:  Patient  and Patient address    Telemedicine Visit: The patient's condition can be safely assessed and treated via synchronous audio and visual telemedicine encounter.      Reason for Telemedicine Visit: Patient convenience (e.g. access to timely appointments / distance to available provider)    Originating Site (Patient Location): Patient's home    Distant Site (Provider Location): [facility] MENT[facility]    Consent:  The patient/guardian has verbally consented to: the potential risks and benefits of telemedicine (video visit) versus in person care; bill my insurance or make self-payment for services provided; and responsibility for payment of non-covered services.     Patient would like the video invitation sent by:  Send to e-mail at: [email or web address]    Mode of Communication:  Video Conference via [facility]    Distant Location (Provider):  On-site    As the provider I attest to compliance with applicable laws and regulations related to telemedicine.          2025     8:39 AM   PHQ   PHQ-9 Total Score 12    Q9: Thoughts of better off dead/self-harm past 2 weeks Several days   F/U: Thoughts of suicide or self-harm No   F/U: Safety concerns Yes       Patient-reported           2025     8:57 AM   ANUJA-7 SCORE   Total Score 11         DATA      Progress Since Last Session (Related to Symptoms / Goals / Homework):   Symptoms: Stable.    Homework:  Completed.      Treatment Objective(s) Addressed in This Session:   Provided feedback on ADHD evaluation. Reviewed test results in depth. Plan of care and recommendations were discussed based on testing data. See full report attached on secondary note in this encounter.     Intervention:   Provided feedback to patient regarding testing results, diagnoses, and treatment recommendations. Test results are not consistent with an ADHD diagnosis. Symptoms are better explained by depression and anxiety disorders. Personalized suggestions regarding symptoms were offered. Patient had the opportunity to ask questions; he expressed understanding.        ASSESSMENT: Current Emotional / Mental Status (status of significant symptoms):   Risk status (Self / Other harm or suicidal ideation)   Patient denies current fears or concerns for personal safety.   Patient denies current or recent suicidal ideation or behaviors.   Patient denies current or recent homicidal ideation or behaviors.   Patient denies current or recent self injurious behavior or ideation.   Patient denies other safety concerns.   Patient reports there has been no change in risk factors since their last session.     Patient reports there has been no change in protective factors since their last session.     Recommended that patient call 911 or go to the local ED should there be a change in any of these risk factors     Appearance:   Appropriate    Eye Contact:   Good    Psychomotor Behavior: Normal    Attitude:   Cooperative    Orientation:   All   Speech    Rate / Production: Normal     Volume:  Normal    Mood:    Appropriate   Affect:    Appropriate    Thought Content:  Clear    Thought Form:  Coherent  Logical    Insight:    Good      Medication Review:   No current psychiatric medications prescribed     Medication Compliance:   NA     Changes in Health Issues:   None reported     Chemical Use Review:   Substance Use: Chemical use reviewed, no active concerns  identified      Nicotine Use: No current tobacco use.      Diagnosis:  1. Generalized anxiety disorder    2. Adjustment disorder with depressed mood        PLAN:   Recommendations are outlined in full evaluation report (attached to this encounter).   Patient indicated understanding and will contact the clinic if there are further questions.    Parts of this documentation may have been completed using dictation software. Potential errors may result and are unintentional.       Merna Park MA         Psychological Testing Services Summary       Testing Evaluation Services Base: 86083  (first 60 mins) Add-on: 38741  (each addtl 60 mins)   Record Review and Clarify Referral Question   8:50am to 9:00am on 2/19/2025 10 minutes   Patient Symptom Management   10:45am to 11:00am on 2/26/2025 15 minutes   Integration/Report Generation   12:00pm to 1:00pm on 2/26/2025 ([place])  1:00pm to 1:30pm on 2/26/2025 ([facility])  9:00am to 9:45am on 3/26/2025 ([facility]MPI-3)  9:00am to 10:00am on 4/1/2025 (Final Report)   60 minutes  30 minutes  45 minutes  60 minutes   Interactive Feedback Session   10:00am to 10:38am on 4/15/2025 38 minutes   Post-Service Work   10:38am to 10:53am on 4/15/2025 15 minutes   Total Time: 273 minutes   Total Units: 1 4       Diagnoses:   1. Generalized anxiety disorder    2. Adjustment disorder with depressed mood

## 2025-04-15 NOTE — PROGRESS NOTES
Psychological Assessment Report    Patient: Tab Campbell  YOB: 1990  MRN: [id number]  Date(s) of assessment: 2/19/2025 and 2/26/2025  Referral Source: Adams Santiago MD [facility]  Reason for Referral: Assessing reported deficits in executive functioning      IDENTIFYING INFORMATION AND BRIEF HISTORY OF PRESENTING PROBLEM: Tab Campbell is a 34-year-old White cisgender male who presented to the initial diagnostic intake appointments on 2/19/2025 and 2/26/2025 (see diagnostic intake dated 2/26/2025 for more detailed background information) due to primary concerns with sustaining attention and task completion     The patient reported difficulties sustaining attention in conversations, focusing on one task or stimulus at a time, and procrastination on tasks beginning in childhood, which he feels has negatively impacted his relationships and work performance. The patient reported a history of difficulty sustaining attention and turning assignments when in school, with a history of nearly failing classes throughout his education. He reported strengths in math and physical education and weaknesses in English and social studies. He identified several coping and organizational tools he attempts to implement to manage his work currently, but notes that maintaining such tools is difficult. Currently, the patient reported experiencing the following symptoms:   Difficulty sustaining attention  Being easily distracted   Difficulty keeping up with conversations (patient reported that changing topics allows him to maintain focus for longer periods)  Difficulty organizing   Procrastination and difficulty completing tasks once started (patient reported that he often struggles to complete the final step of a task)  Restlessness and fidgeting behaviors  Interrupting or speaking over others      Mental Health History: The patient reported a history of anxiety  symptoms related to several stressors, including worst-case scenarios or  what-ifs  that might occur, and daily stressors. The patient's anxiety symptoms have been present for most of his life and have contributed to difficulties with sleep, restlessness, tiredness, difficulty concentrating, and irritability. The patient also reported depression symptoms associated with specific recent stressors, including witnessing the passing of his father in late 2023. The patient denied a history of depression symptoms not better explained by recent stressors or life changes, manic symptoms, posttraumatic stress, social anxiety, phobic responses, and perceptual difficulties. The patient denied issues with disordered eating, sexual compulsivity or gambling.      Developmental History: The patient denied any history of birth-related complications or delays in meeting developmental milestones. The patient was born and raised in [place], [place], and reported that he and his mother were unhoused for much of his early childhood, noting that his mother gave birth to him when she was 15 years old. The patient denied any significant past or current relationship with his biological father and grew up primarily with his mother, as well as his stepfather after his mother  at age 10. The patient denied any known history of concussion but reported a history of several childhood sports-related head injuries without loss of consciousness. The patient reported nearly failing his classes in school throughout his education and reported that he received tutoring services in elementary school. He denied any history of special education programming or diagnosed learning disorders, and reported that his teachers  rooted  for his academic success and provided support when possible. The patient reported difficulty sustaining attention in class as a child but denied any difficulty relating to peers or teachers. The patient also reported  procrastinating on homework assignments throughout his attention, as well as difficulty remembering to turn in assignments once completed. The patient identified academic strengths in math and physical education, and weaknesses in English and social studies. The patient reported playing sports and musical instruments as extracurricular activities, and denied any difficulty sustaining attention in such environments, noting that he was a  fast learner.  The patient did report difficulty sustaining interest in extracurricular activities past a certain skill level, noting that he sometimes lost interest and discontinued musical instruments after earning recognition or awards for his skill. 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 He denied any history of repercussions for procrastination at work but reported that past supervisors have often had to  check in  on his progress. The patient is now self-employed and reported that he maintains accountability through notes and reminders.      Chemical Dependence/Substance Abuse History: The patient reported that he drinks alcohol two to three times weekly in social situations, having two drinks per sitting. He denied any history of risky or dangerous behavior when using substances. He denied any additional substance use history.      SOURCES OF DATA/ASSESSMENT: Review of medical and psychiatric records, consideration of behavioral observations during the testing (if applicable), and the results of the psychological tests are all considered in the preparation of this psychological test report. It is important to note that test results comprise a  hypothesis of the patient's mental health concerns and are not an independent or conclusive assessment. Test results are combined with the patient's available medical, psychological, and behavioral data for an integrated interpretation and report. Due to virtual/remote administration, certain aspects of the assessment process were impacted, such as access to direct patient observation, and maintaining an environment conducive to testing. As such, external factors have the potential to affect the validity of data collected.      TESTS ADMINISTERED:   [place] Multiphasic Personality Inventory-3 (MMPI-3)  CNS Vital Signs Neurocognitive Battery  Roberto Adult ADHD Rating Scale-IV: Self and Other Reports (BAARS-IV)  Roberto Functional Impairment Scale: Self and Other Reports (BFIS)  Roberto Deficits in Executive Functioning Scale (BDEFS)  Generalized Anxiety Disorder Questionnaire (ANUJA-7)  Patient Health Questionnaire- 9 (PHQ-9)     BEHAVIORAL OBSERVATIONS: The patient was pleasant and cooperative throughout all interviews and explanations of the testing process. The patient was oriented to person, place, and time. Mood was neutral. Eye contact intermittent and speech was at normal rate and rhythm. Motor activity was appropriate. Due to virtual/remote administration, direct patient observation was not possible during the testing process, and it is unknown if the patient was able to maintain an environment conducive to testing. As such, external factors have the potential to affect the validity of data collected.      TEST RESULTS: Test results comprise a hypothesis of the patient's mental health concerns and are not an independent or conclusive assessment. Test results are combined with the patient's available medical, psychological, behavioral, and observational data for an integrated interpretation and report.      [place] Multiphasic Personality Inventory-3  The MMPI-3 was administered to evaluate current level of  emotional distress. No items were omitted. Validity scales indicate that the patient provided honest and consistent responses. The patient's responses indicate the presence of diffuse cognitive difficulties, as well as a history of problematic eating behaviors. The patient's profile also indicates a history of engaging in problematic impulsive behavior. His profile does not indicate the presence of significant social or  emotional difficulties, or significant feelings of inefficacy, helplessness, or self-doubt. Individuals with similar profiles may experience difficulty completing daily tasks and sustaining attention, and controlling impulsive behaviors.      CNS Vital Signs Neurocognitive Battery  The CNS Vital Signs Neurocognitive Battery is a remotely-administered assessment comprised of seven core subtests to individually measure the patient's verbal memory, visual memory, motor speed, psychomotor speed, reaction time, focus, ability to sustain attention and ability to adapt to changing rules and tasks.      Above average domain scores indicate a standard score (SS) greater than 109 or a Percentile Rank (MD) greater than 74, indicating a high functioning test subject. Average is a SS  or MD 25-74, indicating normal function. Low Average is a SS 80-89 or MD 9-24 indicating a slight deficit or impairment. Below Average is a SS 70-79 or MD 2-8, indicating a moderate level of deficit or impairment. Very Low is a SS less than 70 or a MD less than 2, indicating a deficit and impairment.  Validity Indicator denotes a guideline for representing the possibility of an invalid test or domain score, and can be influenced by patient understanding, effort, or other conditions.     The patient's results are detailed below:   Domain Standard Score Percentile Description Validity   Neurocognitive Index 108 70 Average Yes   Composite Memory Measure 119 90 Above Average Yes   Verbal Memory 90 25 Average Yes   Visual Memory  143 99 Above Average Yes   Psychomotor Speed 107 68 Average Yes   Reaction Time 95 105 Average Yes   Complex Attention 105 63 Average Yes   Cognitive Flexibility 114 82 Above Average Yes   Processing Speed 85 16 Low Average Yes   Executive Function 116 86 Above Average Yes   Reasoning 113 81 Above Average Yes   Working Memory 103 58 Average Yes   Sustained Attention  108 70 Average Yes   Simple Attention 91 27 Average Yes   Motor Speed 110 75 Above Average Yes      Neurocognitive Index (NCI): Measures an average score derived from the domain scores or a general assessment of the overall neurocognitive status of the patient. The patient's NCI score is 108, with a percentile of 70, and falls within the average range.      Composite Memory: Measures how well subject can recognize, remember, and retrieve words and geometric figures, and is comprised of the Visual and Verbal Memory domains. The patient's Composite Memory score is 119, with a percentile of 90, and falls within the above average range.     Verbal Memory: Measures how well subject can recognize, remember, and retrieve words. The patient's Verbal Memory score is 90, with a percentile of 25, and falls within the average range.     Visual Memory: Measures how well subject can recognize, remember and retrieve geometric figures. The patient's Visual Memory score is 143, with a percentile of 99, and falls within the above average range.     Psychomotor Speed: Measures how well a subject perceives, attends, responds to complex visual-perceptual information and performs simple fine motor coordination, and is comprised of the Motor Speed and Processing Speed indexes. The patient's psychomotor speed score is 107, with a percentile of 68, and falls within the average range.     Reaction Time: Measures how quickly the subject can react, in milliseconds, to a simple and increasingly complex direction set. The patient's Reaction Time score is 95, with a percentile of 37,  and falls within the average range.     Complex Attention: Measures the ability to track and respond to a variety of stimuli over lengthy periods of time and/or perform complex mental tasks requiring vigilance quickly and accurately. The patient's Complex Attention score is 105, with a percentile of 63, and falls within the average range.     Cognitive Flexibility: Measures how well subject is able to adapt to rapidly changing and increasingly complex set of directions and/or to manipulate the information. The patient's Cognitive Flexibility score is 114, with a percentile of 82, and falls within the above average range.     Processing Speed: Measures how well a subject recognizes and processes information i.e., perceiving, attending/responding to incoming information, motor speed, fine motor coordination, and visual-perceptual ability. The patient's processing speed score is 85, with a percentile of 16, and falls within the low average range.     Executive Function: Measures how well a subject recognizes rules, categories, and manages or navigates rapid decision making. The patient's Executive Function score is 116, with a percentile of 86, and falls within the above average range.     Reasoning: Measures how well a subject can perceive and understand the meaning of visual or abstract information and recognizing relationships between visual-abstract concepts. The patient's Reasoning score is 113, with a percentile of 81, and falls within the above average range.     Working Memory: Measures how well a subject can perceive and attend to symbols using short-term memory processes. Also measures the ability to carry out short-term memory tasks that support decision making, problem solving, planning, and execution. The patient's working memory score is 103, with a percentile of 58, and falls within the average range.      Sustained Attention: Measures how well a subject can direct and focus cognitive activity on  "specific stimuli. Also measures how well a subject can focus and complete task or activity, sequence action, and focus during complex thought. The patient's sustained attention score is 108, with a percentile of 70, and falls within the average range.     Simple Attention: Measures the ability to track and respond to a single defined stimulus over lengthy periods of time while performing vigilance and response inhibition quickly and accurately to a simple task. The patient's Simple Attention score is 91, with a percentile of 27, and falls within the average range.      Motor Speed: Measures the ability to perform simple movements to produce and satisfy an intention towards a manual action and goal. The patient's Motor Speed score is 110, with a percentile of 75, and falls within the above average range.     Roberto Adult ADHD Rating Scale-IV: Self and Other Reports (BAARS-IV)  The BAARS-IV assesses for symptoms of ADHD that are experienced in one's daily life. This assessment measure includes self and collateral rating scales designed to provide information regarding current and childhood symptoms of ADHD including inattention, hyperactivity, and impulsivity. Self-report scores are reported as percentiles. Scores at the 76th-83rd percentile are considered marginal, scores at the 84th-92nd percentile are considered borderline, scores at the 93rd-95th percentile are considered mild, scores at the 96th-98th percentile are considered moderate, and those at the 99th percentile are considered severe. Collateral or \"other\" rating scales are reported as number of symptoms observed in comparison to those reported by the patient. Norms and percentile scores are not available for collateral reports.     Current Symptoms Scale - Self Report:   Patient completed the self-report inventory of current symptoms. The results indicate that the patient's total ADHD score was 52 which places the patient in the 98th percentile for " overall ADHD symptoms. In addition, the patient endorsed 7/9 (98th percentile) inattention symptoms, 4/9 (95th percentile) hyperactivity-impulsivity symptoms, and 3/9 (88th percentile) sluggish cognitive tempo symptoms. Patient indicated that he experiences difficulty at school, home, and relationships due to these symptoms. Overall, the results suggest the patient is experiencing borderline to moderate ADHD symptoms.     Current Symptoms Scale - Other Report:   Patient's girlfriend completed the collateral report inventory of current symptoms. Based on the collateral contact's observation of symptoms, the patient demonstrates 3/9 inattention symptoms, 2/5 hyperactivity symptoms, 0/5 impulsivity symptoms, and 0/9 sluggish cognitive tempo symptoms. The client's total ADHD score was 34. The collateral contact indicated the patient has demonstrated impaired functioning at school and at home due to these symptoms. The collateral- and self-report scores are significantly different.     Childhood Symptoms Scale - Self Report:   Patient completed the self-report inventory of childhood symptoms. The results indicate that the patient's total ADHD score was 37 which places the patient in the 84th percentile for overall ADHD symptoms in childhood. In addition, the patient endorsed 3/9 inattention symptoms and 3/9 hyperactivity-impulsivity symptoms. Patient reported experiencing difficulty in school and at home due to these symptoms. Overall, the results suggest that the patient experienced borderline symptoms of ADHD as a child.      Childhood Symptoms Scale - Other Report:   The patient was unable to obtain collateral information about childhood ADHD symptoms. The BAARS-IV Other Report was thus not completed.     Roberto Functional Impairment Scale: Self and Other Reports (BFIS)  The BFIS is used to assess an individuals' psychosocial impairment in major life/daily activities that may be due to a mental health disorder. This  "assessment measure includes self and collateral rating scales. Self-report scores are reported as percentiles. Scores at the 76th-83rd percentile are considered marginal, scores at the 84th-92nd percentile are considered borderline, scores at the 93rd-95th percentile are considered mild, scores at the 96th-98th percentile are considered moderate, and those at the 99th percentile are considered severe. Collateral or \"other\" rating scales are reported as number of symptoms observed in comparison to those reported by the patient. Norms and percentile scores are not available for collateral reports.      Results indicate the patient identified impairment (scores at or greater than 93rd percentile) in the following domains: daily responsibilities (96th percentile). The patient's Mean Impairment Score was 3.1 (51-75th percentile), indicating the patient is reporting 34% impairment in functioning across domains. The collateral contact's scores were not significantly different from the patient's self-report.      Roberto Deficits in Executive Functioning Scale (BDEFS)  The BDEFS is a measure used for evaluating dimensions of adult executive functioning in daily life. This assessment measure includes self and collateral rating scales. Self-report scores are reported as percentiles. Scores at the 76th-83rd percentile are considered marginal, scores at the 84th-92nd percentile are considered borderline, scores at the 93rd-95th percentile are considered mild, scores at the 96th-98th percentile are considered moderate, and those at the 99th percentile are considered severe. Collateral or \"other\" rating scales are reported as number of symptoms observed in comparison to those reported by the patient. Norms and percentile scores are not available for collateral reports.      Results indicate the patient's Total Executive Functioning Score was 185 (86th percentile). The ADHD-Executive Functioning Index Score was 25 (92nd " percentile). These scores suggest the patient has borderline deficits in executive functioning. Results indicate the patient identified significant deficits in the following areas: organization/problem-solving (mild deficits). Patient's partner completed the collateral rating scale, which indicated similar results.     Generalized Anxiety Disorder Questionnaire (ANUJA-7)  This questionnaire is designed to assess for anxiety in adults. Based on the score, the patient is experiencing mild symptoms of anxiety. Patient identified the following symptoms of anxiety: worrying about many different things, irritability, restlessness, fear that something awful might happen, and difficulty relaxing.     Patient Health Questionnaire (PHQ-9)  This questionnaire is designed to assess for depression in adults. Based on the score, the patient is experiencing mild symptoms of depression. Patient identified the following symptoms of depression: depressed mood, loss of interest, difficulty with sleep, low energy, feelings of failure, and poor concentration.      SUMMARY: Tab Campbell is a 34-year-old White cisgender male who completed psychological testing remotely/virtually. Testing was requested to provide updated diagnostic clarification and necessary treatment recommendations.      Patient first completed a diagnostic interview with mental health symptoms, ADHD symptoms, and background information was gathered. Patient self-reported seven symptoms of inattention, and four symptoms of hyperactivity, and indicated that his abilities to function effectively at home and in relationships are significantly impaired. His self-reported symptoms on Roberto measures of ADHD symptoms were consistent with this information; the patient reported seven inattention symptoms and four hyperactivity-impulsivity symptoms. The patient also reported 3/9 inattention symptoms and 3/9 hyperactivity-impulsivity symptoms in childhood, but was unable to  obtain collateral information about childhood ADHD symptoms. The patient's girlfriend provided collateral information about the patient's current ADHD symptoms, which indicated fewer inattention and hyperactivity-impulsivity symptoms observed compared to the patient's self-report. Patient's MMPI-3 scores were consistent with self-reported diffuse cognitive difficulties and reported  constant need to eat  and need for specific rules for limiting portions when eating.     An objective measure of neurocognitive functioning was also administered. Results indicated verbal memory to be in the overall above average range. The patient was able to recognize target words from a word list and discriminate between target words in the average range immediately. He was able to recognize the target words in the low range after a delay, but discriminated between target and non-target words in the above-average range after a delay. Motor functioning appears to be intact, as motor speed, psychomotor speed, and reaction time are all in the average to above average ranges. On the Stroop test, the patient was able to inhibit the salient but incorrect responses in the low average range and demonstrated reaction times in the average range (meaning, similar reaction times to peers). On a test of shifting attention, the patient adjusted his responses according to changing rule sets in the above average range, with reaction times and errors in the above average range (meaning, shorter reaction times and fewer errors than peers); this indicates a strength in the patient's mental flexibility and abstraction capabilities. As such, cognitive flexibility, executive function, and complex attention were all scored in the average to above average ranges. The patient's processing speed score is in the low average range, indicating possible difficulty balancing speed and accuracy on various throughout the battery. On the CNS Vital Signs, ADHD is  associated with significant deficits in attention, processing speed, and executive functioning capabilities, which are present in the patient's scores but not to a sufficient degree to warrant a diagnosis of ADHD. The patient's self-reported symptoms and his responses to Roberto questionnaires are generally consistent and indicative of adulthood ADHD symptoms, although these symptoms may be better explained by adverse childhood experiences that may have negatively impacted his school performance, and current anxiety and depression symptoms. Therefore, based on the measures administered and self-reported information provided by the patient, current problems cannot be conceptualized as having a neurodevelopmental basis. See plan of care and recommendations below.      Referral Question Response: DSM-5 criteria for ADHD:   Symptom Count - Are there sufficient symptoms for diagnosis? Yes; patient endorsed sufficient inattention and hyperactive-impulsive symptoms during clinical interviews.  Onset - Were several symptoms present before 12 years of age? Yes; patient endorsed childhood difficulties with sustaining attention in school.   Pervasiveness - Are several symptoms present in at least two settings? Yes; patient reported that symptoms are problematic at home and in relationships.   Impairment - Do symptoms interfere with or reduce the quality of functioning? Yes; patient is unable to complete daily tasks effectively.   Exclusions - Are symptoms better explained by another disorder or factor? Yes; the patient anxiety and depression symptoms which may better explain his present concerns.      The patient meets the following DSM-5 criteria for generalized anxiety disorder:   Excessive anxiety and worry occurring more days than not for at least 6 months, about a number of events or activities.   The individual finds it difficult to control the worry.   The anxiety and worry are associated with three (or more) of the  following six symptoms (with at least some symptoms having been present for more days than not for the past six months):   Restlessness or feeling keyed up or on edge.  Being easily fatigued.   Difficulty concentrating or mind going blank.   Irritability.   Muscle tension.    Sleep disturbance (difficulty falling or staying asleep, or restless, unsatisfying sleep).   The anxiety, worry, or physical symptoms cause clinically significant distress or impairment in social, occupational, or other important areas of functioning.   The disturbance is not attributable to the physiological effects of a substance or another medical condition.   The disturbance is not better explained by another medical disorder.     The patient meets the following DSM-5 criteria for adjustment disorder with depressed mood:   A. The development of emotional or behavioral symptoms in response to an identifiable stressor(s) occurring within 3 months of the onset of the stressor(s).   B. These symptoms or behaviors are clinically significant, as evidenced by one or both of the followin. Marked distress that is out of proportion to the severity of intensity of the stressor, taking into account the external context and the cultural factors that                 Might influence symptom severity and presentation.               2. Significant impairment in social, occupational, or other important areas of functioning.   C. The stress-related disturbance does not meet the criteria for another mental disorder and is not merely an exacerbation of a preexisting mental disorder.   D. The symptoms do not represent normal bereavement and are not better explained by prolonged grief disorder.   E. Once the stressor or its consequences have terminated, the symptoms do not persist for more than an additional 6 months.     DIAGNOSES:   F41.1 Generalized anxiety disorder   F43.21 Adjustment disorder with depressed mood     PLAN OF CARE:   Discuss  the following with your primary care provider:   Consider a trial of psychotropic medication. This may help alleviate some of the patient's mood symptoms.     Consider individual psychotherapy to help manage mood symptoms. Research indicates that outcomes are best with both medication and therapy.      RECOMMENDATIONS:   Due to the patient's reported attention and concentration difficulties, the following health/lifestyle changes, when combined, can significantly improve symptoms:   Avoid simple carbohydrates at breakfast. Aim for only complex carbohydrates and lean protein for your morning meal.   Engage in aerobic exercise 3 times per week for 30 minutes, ensuring that your heart rate stays within your training zone. Further, reading the book,  Spark,  by Vikram Medina M.D. can help the patient understand the benefits of exercise on the brain.   Research suggests that taking a high-quality multi-vitamin and antioxidant (1/2 cup of  blueberries) daily in conjunction with balanced nutrition can be helpful.  Aim for the high end of daily water intake: around 72 ounces per day.  Ensure regular meals and snacks to maintain optimal attention.     The following may be beneficial in managing some of the patient's attention and concentration difficulties:  Due to the patient's difficulties with attention and concentration, consider working in a completely distraction-free area while completing tasks. Workspaces should be completely clear except for the materials needed for the current task. Both visual and auditory distractions should be decreased as much as possible.  Considering decreased ability to focus and maintain attention, it is recommended that the patient take frequent breaks while completing tasks. This will help to maintain attention and effort. The patient may benefit from the use of a [employer] Timer. The timer works by using built-in break times. After working on a task consistently for 25 minutes, the timer  reminds the user to take a five-minute break before continuing, etc. A [employer] timer can be downloaded as a free юлия to a phone or tablet.  Due to the patient's attentional and concentration symptoms, it is recommended to increase organization with the use of lists and calendars. Significantly increasing structure to the day and adhering to a set schedule can increase your ability to complete responsibilities, track deadline, etc. Breaking these tasks down into their component parts and recording them in a calendar/planner will likely be beneficial. Patient would benefit from setting feasible timelines for completion of activities. By establishing clear priorities for completing tasks, you can more likely complete the most important tasks first. The patient may also choose to elect to a friend or family member to help hold them accountable.     Avoid multitasking. Attempting to work on multiple tasks and projects the same increases the likelihood that an error will occur. Focus on one task at a time.     The patient may benefit from engaging in mindfulness practices. This may include breathing techniques, apps that provide guided meditation, or more interactive activities such as coloring.         Merna Park MA

## 2025-04-28 ENCOUNTER — TRANSCRIBE ORDERS (OUTPATIENT)
Dept: OTHER | Age: 35
End: 2025-04-28

## 2025-04-28 DIAGNOSIS — F41.9 ANXIETY: ICD-10-CM

## 2025-04-28 DIAGNOSIS — F32.A DEPRESSION: Primary | ICD-10-CM

## 2025-06-10 ENCOUNTER — VIRTUAL VISIT (OUTPATIENT)
Dept: BEHAVIORAL HEALTH | Facility: CLINIC | Age: 35
End: 2025-06-10
Payer: COMMERCIAL

## 2025-06-10 DIAGNOSIS — F33.1 MODERATE EPISODE OF RECURRENT MAJOR DEPRESSIVE DISORDER (H): Primary | ICD-10-CM

## 2025-06-10 PROCEDURE — 90832 PSYTX W PT 30 MINUTES: CPT | Mod: 95 | Performed by: MARRIAGE & FAMILY THERAPIST

## 2025-06-10 ASSESSMENT — PATIENT HEALTH QUESTIONNAIRE - PHQ9
SUM OF ALL RESPONSES TO PHQ QUESTIONS 1-9: 13
SUM OF ALL RESPONSES TO PHQ QUESTIONS 1-9: 13
10. IF YOU CHECKED OFF ANY PROBLEMS, HOW DIFFICULT HAVE THESE PROBLEMS MADE IT FOR YOU TO DO YOUR WORK, TAKE CARE OF THINGS AT HOME, OR GET ALONG WITH OTHER PEOPLE: SOMEWHAT DIFFICULT

## 2025-06-10 NOTE — PROGRESS NOTES
[facility] Primary Care: Integrated Behavioral Health    Integrated Behavioral Health   Mental Health & Addiction Services      Progress Note - Initial [facility] Visit     Patient Name: Tab Campbell    Date: Li 10, 2025  Service Type: Individual   Visit Start Time: 1103AM  Visit End Time:  1123AM   Attendees: Patient   Service Modality: Video Visit:      Provider verified identity through the following two step process.  Patient provided:  Patient  and Patient address    Telemedicine Visit: The patient's condition can be safely assessed and treated via synchronous audio and visual telemedicine encounter.      Reason for Telemedicine Visit: Patient has requested telehealth visit    Originating Site (Patient Location): Patient's home    Distant Site (Provider Location): [facility]    Consent:  The patient/guardian has verbally consented to: the potential risks and benefits of telemedicine (video visit) versus in person care; bill my insurance or make self-payment for services provided; and responsibility for payment of non-covered services.     Patient would like the video invitation sent by:  My Chart    Mode of Communication:  Video Conference via [facility]    Distant Location (Provider):  Off-site    As the provider I attest to compliance with applicable laws and regulations related to telemedicine.     [facility] Visit Activities (Refresh list every visit): NEW and [facility] Only         DATA:     Interactive Complexity: No   Crisis: No     Assessments completed:     The following assessments were completed by patient for this visit:  PHQ2:   Phq2 (    [employer],All Rights Reserved. Used With Permission. Developed By Vee Hlot,Rocío Gonzalez,Lorenzo Machado And Colleagues,With An Educational Sheng From [employer].)    2025 10:42 AM CDT - Filed by Patient 2024  9:12 AM CST - Filed by Patient   The following questionnaire should  only be answered by the patient. Are you the patient? Yes Yes   Over the last 2 weeks, how often have you been bothered by any of the following problems?     Q1: Little interest or pleasure in doing things Not at all Not at all   Q2: Feeling down, depressed or hopeless Several days Several days   PHQ2 (   1999 [employer],ALL RIGHTS RESERVED. USED WITH PERMISSION. DEVELOPED BY JJ REYNOLDS,FERNANDO KENNEY,BEBO HOWARD AND COLLEAGUES,WITH AN EDUCATIONAL ZELALEM FROM [employer].)     PHQ-2 Score (range: 0 - 6) 1 1       PHQ9:       2/19/2025     8:39 AM 6/10/2025    10:48 AM   PHQ-9 SCORE   PHQ-9 Total Score MyChart 12 (Moderate depression) 13 (Moderate depression)   PHQ-9 Total Score 12  13        Patient-reported     GAD2:       2/19/2025     8:40 AM 2/26/2025    12:53 AM   ANUJA-2   Feeling nervous, anxious, or on edge 1 0   Not being able to stop or control worrying 1 0   ANUJA-2 Total Score 2  0        Patient-reported     GAD7:       2/19/2025     8:57 AM   ANUJA-7 SCORE   Total Score 11     CAGE-AID:       2/16/2025    10:43 AM   CAGE-AID Total Score   Total Score 3    Total Score MyChart 3 (A total score of 2 or greater is considered clinically significant)       Patient-reported     PROMIS 10-Global Health (all questions and answers displayed):       2/16/2025    10:43 AM 2/26/2025    12:54 AM 6/10/2025    10:49 AM   PROMIS 10   In general, would you say your health is: Very good Very good Very good   In general, would you say your quality of life is: Very good Good Good   In general, how would you rate your physical health? Good Very good Good   In general, how would you rate your mental health, including your mood and your ability to think? Good Good Fair   In general, how would you rate your satisfaction with your social activities and relationships? Good Very good Good   In general, please rate how well you carry out your usual social activities and roles Fair Very good Good   To what extent are you  able to carry out your everyday physical activities such as walking, climbing stairs, carrying groceries, or moving a chair? Completely Mostly Mostly   In the past 7 days, how often have you been bothered by emotional problems such as feeling anxious, depressed, or irritable? Sometimes Sometimes Often   In the past 7 days, how would you rate your fatigue on average? Moderate Moderate Mild   In the past 7 days, how would you rate your pain on average, where 0 means no pain, and 10 means worst imaginable pain? 0 1 0   In general, would you say your health is: 4 4 4   In general, would you say your quality of life is: 4 3 3   In general, how would you rate your physical health? 3 4 3   In general, how would you rate your mental health, including your mood and your ability to think? 3 3 2   In general, how would you rate your satisfaction with your social activities and relationships? 3 4 3   In general, please rate how well you carry out your usual social activities and roles. (This includes activities at home, at work and in your community, and responsibilities as a parent, child, spouse, employee, friend, etc.) 2 4 3   To what extent are you able to carry out your everyday physical activities such as walking, climbing stairs, carrying groceries, or moving a chair? 5 4 4   In the past 7 days, how often have you been bothered by emotional problems such as feeling anxious, depressed, or irritable? 3 3 4   In the past 7 days, how would you rate your fatigue on average? 3 3 2   In the past 7 days, how would you rate your pain on average, where 0 means no pain, and 10 means worst imaginable pain? 0 1 0   Global Mental Health Score 13  13  10    Global Physical Health Score 16  15  16    PROMIS TOTAL - SUBSCORES 29  28  26        Patient-reported     [place] Suicide Severity Rating Scale (Lifetime/Recent)      2/19/2025     9:04 AM 6/10/2025    11:05 AM   [place] Suicide Severity Rating (Lifetime/Recent)   1. Wish to be  "Dead (Lifetime) Y Y   Wish to be Dead Description (Lifetime) 2024, when dad passed away; history of other occurrences of passive SI prior to this, \"when things get a little tough\"    1. Wish to be Dead (Past 1 Month) N Y   2. Non-Specific Active Suicidal Thoughts (Lifetime) N N   2. Non-Specific Active Suicidal Thoughts (Past 1 Month)  N   Controllability (Lifetime) 1    Reasons for Ideation (Lifetime) 5    Actual Attempt (Lifetime) N    Has subject engaged in non-suicidal self-injurious behavior? (Lifetime) Y    Has subject engaged in non-suicidal self-injurious behavior? (Past 3 Months) N    Interrupted Attempts (Lifetime) N    Aborted or Self-Interrupted Attempt (Lifetime) N    Preparatory Acts or Behavior (Lifetime) N    Calculated C-SSRS Risk Score (Lifetime/Recent) No Risk Indicated Low Risk        Referral:   Patient was referred to [facility] by self.    Reason for referral: clarify behavioral health diagnosis and determine behavioral health treatment options.      [facility] introduced self and role. Discussed informed consent and limits to confidentiality.     Presenting Concerns/ Current Stressors:   Patient is looking for therapy for processing thoughts and verbal processing. Pt tends to have racing thoughts, history of depression, and lost parent (dad) 1 year ago/supporting family.     Pt has not done therapy in the past and is looking to start.     Patient has episodes when Pt withdraws from relationships with partner/friends and spends the day, sedentary difficulty doing things. Episodes can last up to all weekend but has low grade depression.          Therapeutic Interventions:  Motivational Interviewing (MI): Validated patient's thoughts, feelings and experience. Expressed respect for patient's autonomy in decision making.  Asked open-ended questions to invite patient's self-reflection and self-direction around change and what is important for them in working towards their goals.  Expressed and demonstrated " empathy through reflective listening.     Response to treatment interventions:   Patient was receptive to interventions utilized.  Patient was engaged in the therapy process.      Safety Issues and Plan for Safety and Risk Management:     Patient has had a history of suicidal ideation: passive   Patient denies current fears or concerns for personal safety.   Patient denies current or recent suicidal ideation or behaviors.   Patient denies current or recent homicidal ideation or behaviors.   Patient denies current or recent self injurious behavior or ideation.   Patient denies other safety concerns.   Recommended that patient call 911 or go to the local ED should there be a change in any of these risk factors   Patient reports there are no firearms in the house.       ASSESSMENT:   Mental Status:     Appearance:   Appropriate    Eye Contact:   Good    Psychomotor Behavior: Normal    Attitude:   Cooperative    Orientation:   All   Speech Rate / Production: Normal/ Responsive   Volume:   Normal    Mood:    Normal   Affect:    Appropriate    Thought Content:  Clear    Thought Form:  Coherent  Goal Directed  Logical    Insight:    Good         Diagnostic Criteria:   Major Depressive Disorder  CRITERIA (A-C) REPRESENT A MAJOR DEPRESSIVE EPISODE - SELECT THESE CRITERIA  A) Recurrent episode(s) - symptoms have been present during the same 2-week period and represent a change from previous functioning 5 or more symptoms (required for diagnosis)   - Depressed mood. Note: In children and adolescents, can be irritable mood.     - Diminished interest or pleasure in all, or almost all, activities.    - Significant weight gainincrease in appetite.    - waking up with distress at night during sleep.    - Fatigue or loss of energy.    - Diminished ability to think or concentrate, or indecisiveness.    - Recurrent thoughts of death (not just fear of dying), recurrent suicidal ideation without a specific plan, or a suicide attempt or  a specific plan for committing suicide.   B) The symptoms cause clinically significant distress or impairment in social, occupational, or other important areas of functioning  C) The episode is not attributable to the physiological effects of a substance or to another medical condition  D) The occurence of major depressive episode is not better explained by other thought / psychotic disorders  E) There has never been a manic episode or hypomanic episode        DSM5 Diagnoses: (Sustained by DSM5 Criteria Listed Above)     Diagnoses: 296.31 (F33.0) Major Depressive Disorder, Recurrent Episode, Mild _  300.02 (F41.1) Generalized Anxiety Disorder     Rule out Dysthymia     Psychosocial / Contextual Factors: Relationship Concerns, Occupational Issues, Interpersonal Concerns, and Grief/Loss       Collateral Reports Completed:   Routed note to PCP        PLAN: (Homework, other):     1. Patient was provided:  recommendation to schedule follow-up with [facility] recommendation to follow through on referrals     2. Provider [place] the following [place] [facility] [place]        3. Suicide Risk and Safety Concerns were assessed for Tab Campbell    Safety Plan:   Patient denied any current/recent/lifetime history of suicidal ideation and/or behaviors. Recommended that patient call 911 or go to the local ED should there be a change in any of these risk factors       Aung POTTER, [facility]   Li 10, 2025    Answers submitted by the patient for this visit:  Patient Health Questionnaire (Submitted on 6/10/2025)  If you checked off any problems, how difficult have these problems made it for you to do your work, take care of things at home, or get along with other people?: Somewhat difficult  PHQ9 TOTAL SCORE: 13

## 2025-06-11 ENCOUNTER — PATIENT OUTREACH (OUTPATIENT)
Dept: CARE COORDINATION | Facility: CLINIC | Age: 35
End: 2025-06-11
Payer: COMMERCIAL

## 2025-06-24 ENCOUNTER — VIRTUAL VISIT (OUTPATIENT)
Facility: CLINIC | Age: 35
End: 2025-06-24
Payer: COMMERCIAL

## 2025-06-24 DIAGNOSIS — F33.1 MODERATE EPISODE OF RECURRENT MAJOR DEPRESSIVE DISORDER (H): ICD-10-CM

## 2025-06-24 PROCEDURE — 90791 PSYCH DIAGNOSTIC EVALUATION: CPT | Mod: 95 | Performed by: MARRIAGE & FAMILY THERAPIST

## 2025-06-24 ASSESSMENT — PATIENT HEALTH QUESTIONNAIRE - PHQ9
SUM OF ALL RESPONSES TO PHQ QUESTIONS 1-9: 6
SUM OF ALL RESPONSES TO PHQ QUESTIONS 1-9: 6
10. IF YOU CHECKED OFF ANY PROBLEMS, HOW DIFFICULT HAVE THESE PROBLEMS MADE IT FOR YOU TO DO YOUR WORK, TAKE CARE OF THINGS AT HOME, OR GET ALONG WITH OTHER PEOPLE: SOMEWHAT DIFFICULT

## 2025-06-24 ASSESSMENT — ANXIETY QUESTIONNAIRES
2. NOT BEING ABLE TO STOP OR CONTROL WORRYING: NOT AT ALL
5. BEING SO RESTLESS THAT IT IS HARD TO SIT STILL: NOT AT ALL
8. IF YOU CHECKED OFF ANY PROBLEMS, HOW DIFFICULT HAVE THESE MADE IT FOR YOU TO DO YOUR WORK, TAKE CARE OF THINGS AT HOME, OR GET ALONG WITH OTHER PEOPLE?: SOMEWHAT DIFFICULT
1. FEELING NERVOUS, ANXIOUS, OR ON EDGE: SEVERAL DAYS
6. BECOMING EASILY ANNOYED OR IRRITABLE: MORE THAN HALF THE DAYS
GAD7 TOTAL SCORE: 3
GAD7 TOTAL SCORE: 3
IF YOU CHECKED OFF ANY PROBLEMS ON THIS QUESTIONNAIRE, HOW DIFFICULT HAVE THESE PROBLEMS MADE IT FOR YOU TO DO YOUR WORK, TAKE CARE OF THINGS AT HOME, OR GET ALONG WITH OTHER PEOPLE: SOMEWHAT DIFFICULT
7. FEELING AFRAID AS IF SOMETHING AWFUL MIGHT HAPPEN: NOT AT ALL
GAD7 TOTAL SCORE: 3
3. WORRYING TOO MUCH ABOUT DIFFERENT THINGS: NOT AT ALL
7. FEELING AFRAID AS IF SOMETHING AWFUL MIGHT HAPPEN: NOT AT ALL
4. TROUBLE RELAXING: NOT AT ALL

## 2025-06-24 ASSESSMENT — COLUMBIA-SUICIDE SEVERITY RATING SCALE - C-SSRS
TOTAL  NUMBER OF INTERRUPTED ATTEMPTS SINCE LAST CONTACT: NO
TOTAL  NUMBER OF ABORTED OR SELF INTERRUPTED ATTEMPTS SINCE LAST CONTACT: NO
SUICIDE, SINCE LAST CONTACT: NO
ATTEMPT SINCE LAST CONTACT: NO
1. SINCE LAST CONTACT, HAVE YOU WISHED YOU WERE DEAD OR WISHED YOU COULD GO TO SLEEP AND NOT WAKE UP?: NO
2. HAVE YOU ACTUALLY HAD ANY THOUGHTS OF KILLING YOURSELF?: NO
6. HAVE YOU EVER DONE ANYTHING, STARTED TO DO ANYTHING, OR PREPARED TO DO ANYTHING TO END YOUR LIFE?: NO

## 2025-06-24 NOTE — PROGRESS NOTES
Answers submitted by the patient for this visit:  Patient Health Questionnaire (Submitted on 2025)  If you checked off any problems, how difficult have these problems made it for you to do your work, take care of things at home, or get along with other people?: Somewhat difficult  PHQ9 TOTAL SCORE: 6  Patient Health Questionnaire (G7) (Submitted on 2025)  ANUJA 7 TOTAL SCORE: 3        [facility]         PATIENT'S NAME: Tab Campbell  PREFERRED NAME: Tab  PRONOUNS:      MRN: [id number]  : 1990  ADDRESS: [address] [place] [id number]  ACCT. NUMBER:  [id number]  DATE OF SERVICE: 25  START TIME: 10:00a  END TIME: ***  PREFERRED PHONE: [phone]  May we leave a program related message:   EMERGENCY CONTACT:  obtained  .  SERVICE MODALITY:  Video Visit:      Provider verified identity through the following two step process.  Patient provided:  Patient     Telemedicine Visit: The patient's condition can be safely assessed and treated via synchronous audio and visual telemedicine encounter.      Reason for Telemedicine Visit: Patient convenience (e.g. access to timely appointments / distance to available provider)    Originating Site (Patient Location): Patient's home    Distant Site (Provider Location): Provider Remote Setting- Home Office    Consent:  The patient/guardian has verbally consented to: the potential risks and benefits of telemedicine (video visit) versus in person care; bill my insurance or make self-payment for services provided; and responsibility for payment of non-covered services.     Patient would like the video invitation sent by:  Send to e-mail at: [email or web address]    Mode of Communication:  Video Conference via Amwell    Distant Location (Provider):  Off-site    As the provider I attest to compliance with applicable laws and regulations related to telemedicine.    UNIVERSAL ADULT Mental Health DIAGNOSTIC ASSESSMENT    Identifying  "Information:  Patient is a 34 year old,   individual.  Patient was referred for an assessment by referring provider.  Patient attended the session alone.    Chief Complaint:   The reason for seeking services at this time is: \"Depression\".  The problem(s) began [birth year].  -PT notes having ongoing depressive related sx through his life. Father passed away in 2024 unexpectedly which has led to more responsibilities. COVID had an impact on pt and family due to partner having more mitigation's put in place and not wanting to engage in social functions. Partner would rather not see people unless outside and pt would rather see more people.   -Responsibilities within family extend to mother and younger sisters who live together, mother has some illness issues. Pt will help financially and in other ways with their needs.     Social/Family History:  Patient reported they grew up in [facility]  .  They were raised by biological mother  .  Parents one or both remarried.  Patient reported that their childhood was challenging at first.  Patient described their current relationships with family of origin as positive with mother and sister. Father passed away in 2023 unexpectedly, some hx of heart issues but nothing that suggested he was sick. Was in hospital for 6 weeks and passed on Damian Day 2023. Since then things have been difficult within the family, pt was very close for pt and family, main source of income for family. Father who passed is not bio father, in pt's life around age 8. No relationship with bio father.   -Early in pt's life, they were homeless but were able to make things work.   -Mother had a big mental breakdown in early 2025 and there were some safety concerns for mother but she did not engage in any harmful behaviors. Mom is now working part time and sister is also working and pt helping her through school.     Hobbies: video games, working on cars, exercise, bike riding. Trying to find " hobbies that both can enjoy.     The patient describes their cultural background as .  Cultural influences and impact on patient's life structure, values, norms, and healthcare: NA. These factors will be addressed in the Preliminary Treatment plan. Patient identified their preferred language to be English. Patient reported they does not need the assistance of an  or other support involved in therapy.     Patient reported had no significant delays in developmental tasks.   Patient's highest education level was high school graduate  .  Patient identified the following learning problems: none reported.  Modifications will not be used to assist communication in therapy. Patient reports they are  able to understand written materials.    Patient's current relationship status is has a partner or significant other for 18 years since high school.   Patient identified their sexual orientation as heterosexual.  Patient reported having 0 child(nii). Patient identified partner; mother; pets as part of their support system.  Patient identified the quality of these relationships as good.   -Within the relationship, partner has significant anxiety about getting sick, not working at this time. Pt will need to take on the burden financially within the home and also assure her of his behaviors to ensure he is safe.   -Partner is doing some therapy services to deal with problems but not helpful. COVID illness is the major concern. Current behaviors within the relationship do not appear to be sustainable.     Patient's current living/housing situation involves staying in own home/apartment.  The immediate members of family and household include Chely Marcus, 34,Partner and they report that housing is stable.    Patient is currently employed fulltime.  Patient reports their finances are obtained through employment. Patient does not identify finances as a current stressor. Pt is a  and  , working on multiple projects at this time.  Enjoys the work and has always wanted to do this work. Generally work business hours but can have some longer days and work on weekend. Both roles are for companies and their needs.     Patient reported that they have not been involved with the legal system. Patient does not report being under probation/ parole/ jurisdiction. They are not under any current court jurisdiction. .    Patient's Strengths and Limitations:  Patient identified the following strengths or resources that will help them succeed in treatment: commitment to health and well being. Things that may interfere with the patient's success in treatment include: financial hardship, lack of family support, and unsupportive environment.     Assessments:  The following assessments were completed by patient for this visit:  PHQ9:       2/19/2025     8:39 AM 6/10/2025    10:48 AM 6/24/2025     9:39 AM   PHQ-9 SCORE   PHQ-9 Total Score MyChart 12 (Moderate depression) 13 (Moderate depression) 6 (Mild depression)   PHQ-9 Total Score 12  13  6        Patient-reported     GAD7:       2/19/2025     8:57 AM 6/24/2025     9:39 AM   ANUJA-7 SCORE   Total Score  3 (minimal anxiety)   Total Score 11 3        Patient-reported       Personal and Family Medical History:  Patient does not report a family history of mental health concerns.  Patient reports family history is not on file..     Patient does report Mental Health Diagnosis and/or Treatment.  Patient Patient reported the following previous diagnoses which include(s): Depression. Patient has not received mental health services in the past: none.  Psychiatric Hospitalizations: None.  Patient denies a history of civil commitment.  Patient is not receiving other mental health services.  These include none.       Patient has had a physical exam to rule out medical causes for current symptoms.  Date of last physical exam was within the past year. Client was  encouraged to follow up with PCP if symptoms were to develop. The patient has a [facility] Provider, who is named Adams Santiago..  Patient reports no current medical concerns.  Patient denies any issues with pain..   There are not significant appetite / nutritional concerns / weight changes.   Patient does not report a history of head injury / trauma / cognitive impairment.      Patient reports not taking any current medications    Medication Adherence:  Patient reports not currently prescribed.    Patient Allergies:  No Known Allergies    Medical History:  No past medical history on file.      Current Mental Status Exam:   Appearance:  Appropriate    Eye Contact:  Good   Psychomotor:  Normal       Gait / station:  no problem  Attitude / Demeanor: Cooperative   Speech      Rate / Production: Normal/ Responsive      Volume:  Normal  volume      Language:  intact  Mood:   Depressed  Normal  Affect:   Flat    Thought Content: Clear   Thought Process: Coherent       Associations: No loosening of associations  Insight:   Good   Judgment:  Intact   Orientation:  Person  Attention/concentration: Good    Substance Use:   Patient did not report a family history of substance use concerns; see medical history section for details.  Patient has not received chemical dependency treatment in the past.  Patient has not ever been to detox.      Patient is not currently receiving any chemical dependency treatment. Patient reported the following problems as a result of their substance use:  none.    Patient will only drink alcohol on weekends, minimal caffeine.    Significant Losses / Trauma / Abuse / Neglect Issues:   Patient did not serve in the .  There are indications or report of significant loss, trauma, abuse or neglect issues related to: are indications or report of significant loss, trauma, abuse or neglect issues related to.  Patient has not been a victim of exploitation.  Concerns for  possible neglect are not present.     Safety Assessment:   Patient denies current or past homicidal ideation and behaviors.  Patient denies current or past suicidal ideation and behaviors.  Patient denies current or past self-injurious behaviors.  Patient denied risk behaviors associated with substance use.  Patient denies any high risk behaviors associated with mental health symptoms.  Patient denied current or past personal safety concerns.    Patient denies past of current/recent assaultive behaviors.    Patient denied a history of sexual assault behaviors.     Patient reports there are not,   firearms in the house.    Patient reports the following protective factors: forward or future oriented thinking; dedication to family or friends; safe and stable environment; effectively controls impulses; sense of belonging; daily obligations; effective problem solving skills; commitment to well being; sense of meaning    Risk Plan:  See Recommendations for Safety and Risk Management Plan    Review of Symptoms per patient report:   {OP BEH ADULT ROS:733669}    {OP BEH DA SUMMARY:940398}    Provider Name/ Credentials:  ***  June 24, 2025          indecisiveness.     Functional Status:  Patient reports the following functional impairments:  health maintenance and management of the household and or completion of tasks.         Clinical Summary:  1. Psychosocial Factors:  Relationship concerns.  Cultural and Contextual Factors:   2. Principal DSM5 Diagnoses  (Sustained by DSM5 Criteria Listed Above):   296.32 (F33.1) Major Depressive Disorder, Recurrent Episode, Moderate _.    5. Prognosis: Expect Improvement.  6. Likely consequences of symptoms if not treated: .  7. Patient strengths include:  has a previous history of therapy .     Recommendations:     1. Plan for Safety and Risk Management:   Safety and Risk: Recommended that patient call 911 or go to the local ED should there be a change in any of these risk factors        Report to child / adult protection services was NA.     8. Records:   These were reviewed at time of assessment.   Information in this assessment was obtained from the medical record and  provided by patient who is a good historian.    Patient will have open access to their mental health medical record.    9.   Interactive Complexity: No    10. Safety Plan: No Safety plan indicated    Provider Name/ Credentials:  TARIQ Fink  June 24, 2025

## 2025-07-15 ENCOUNTER — VIRTUAL VISIT (OUTPATIENT)
Facility: CLINIC | Age: 35
End: 2025-07-15
Payer: COMMERCIAL

## 2025-07-15 DIAGNOSIS — F33.1 MODERATE EPISODE OF RECURRENT MAJOR DEPRESSIVE DISORDER (H): Primary | ICD-10-CM

## 2025-07-15 PROCEDURE — 90834 PSYTX W PT 45 MINUTES: CPT | Mod: 95 | Performed by: MARRIAGE & FAMILY THERAPIST

## 2025-07-15 NOTE — PROGRESS NOTES
[facility]                                     Progress Note    Patient Name: Tab Campbell  Date: 7/15/25         Service Type: Individual      Session Start Time: 1:04p  Session End Time: 1:54p     Session Length: 50    Session #: 2    Attendees: Client attended alone    Service Modality:  Video Visit:      Provider verified identity through the following two step process.  Patient provided:  Patient     Telemedicine Visit: The patient's condition can be safely assessed and treated via synchronous audio and visual telemedicine encounter.      Reason for Telemedicine Visit: Patient convenience (e.g. access to timely appointments / distance to available provider)    Originating Site (Patient Location): Patient's home    Distant Site (Provider Location): Provider Remote Setting- Home Office    Consent:  The patient/guardian has verbally consented to: the potential risks and benefits of telemedicine (video visit) versus in person care; bill my insurance or make self-payment for services provided; and responsibility for payment of non-covered services.     Patient would like the video invitation sent by:  Send to e-mail at: [email or web address]    Mode of Communication:  Video Conference via Amwell    Distant Location (Provider):  Off-site    As the provider I attest to compliance with applicable laws and regulations related to telemedicine.    DATA  Interactive Complexity: No  Crisis: No        Progress Since Last Session (Related to Symptoms / Goals / Homework):   Symptoms: No change Mood around 6.5/10    Homework: Completed in session      Episode of Care Goals: Satisfactory progress - ACTION (Actively working towards change); Intervened by reinforcing change plan / affirming steps taken     Current / Ongoing Stressors and Concerns:   Last session , overall things have been more stable, had a positive work trip and vacation. Discussed CBT skills around partner situation and family  relationships.      Treatment Objective(s) Addressed in This Session:   Increase interest, engagement, and pleasure in doing things       Intervention:   Motivational Interviewing    MI Intervention: Open-ended questions and Reflections: simple and complex     Change Talk Expressed by the Patient: Need to change Committment to change    Provider Response to Change Talk: E - Evoked more info from patient about behavior change and A - Affirmed patient's thoughts, decisions, or attempts at behavior change     ASSESSMENT: Current Emotional / Mental Status (status of significant symptoms):   Risk status (Self / Other harm or suicidal ideation)   Patient denies current fears or concerns for personal safety.   Patient denies current or recent suicidal ideation or behaviors.   Patient denies current or recent homicidal ideation or behaviors.   Patient denies current or recent self injurious behavior or ideation.   Patient denies other safety concerns.   Patient reports there has been no change in risk factors since their last session.     Patient reports there has been no change in protective factors since their last session.     Recommended that patient call 911 or go to the local ED should there be a change in any of these risk factors     Appearance:   Appropriate    Eye Contact:   Good    Psychomotor Behavior: Normal    Attitude:   Cooperative    Orientation:   All   Speech    Rate / Production: Normal     Volume:  Normal    Mood:    Depressed  Normal   Affect:    Appropriate    Thought Content:  Clear    Thought Form:  Coherent  Logical    Insight:    Good      Medication Review:   No current psychiatric medications prescribed     Medication Compliance:   NA     Changes in Health Issues:   None reported     Chemical Use Review:   Substance Use: Chemical use reviewed, no active concerns identified      Tobacco Use: No current tobacco use.      Diagnosis:  MDD, recurrent, moderate    Collateral Reports Completed:   Not  Applicable    PLAN: (Patient Tasks / Therapist Tasks / Other)  CBT skills    TARIQ Dougherty   7/15/25                                                         ______________________________________________________________________    Individual Treatment Plan    Patient's Name: Tab Campbell  YOB: 1990    Date of Creation: 7/15/25  Date Treatment Plan Last Reviewed/Revised: 10/15/25    DSM5 Diagnoses: 296.32 (F33.1) Major Depressive Disorder, Recurrent Episode, Moderate _  Psychosocial / Contextual Factors:   PROMIS (reviewed every 90 days):     Referral / Collaboration:  Referral to another professional/service is not indicated at this time..    Anticipated number of session for this episode of care: 3-6 sessions  Anticipation frequency of session: Biweekly  Anticipated Duration of each session: 38-52 minutes  Treatment plan will be reviewed in 90 days or when goals have been changed.       MeasurableTreatment Goal(s) related to diagnosis / functional impairment(s)  Goal 1: Patient will engage in using CBT skills for depression sx reduction      Objective #A (Patient Action)    Patient will Increase interest, engagement, and pleasure in doing things.  Status: New - Date: 7/15/25     Intervention(s)  Therapist will teach emotional regulation skills.  .        Patient has reviewed and agreed to the above plan.      TARIQ Dougherty  July 15, 2025

## 2025-08-12 ENCOUNTER — VIRTUAL VISIT (OUTPATIENT)
Facility: CLINIC | Age: 35
End: 2025-08-12
Payer: COMMERCIAL

## 2025-08-12 DIAGNOSIS — F33.1 MODERATE EPISODE OF RECURRENT MAJOR DEPRESSIVE DISORDER (H): Primary | ICD-10-CM

## 2025-08-12 PROCEDURE — 90834 PSYTX W PT 45 MINUTES: CPT | Mod: 95 | Performed by: MARRIAGE & FAMILY THERAPIST
